# Patient Record
Sex: MALE | Race: WHITE
[De-identification: names, ages, dates, MRNs, and addresses within clinical notes are randomized per-mention and may not be internally consistent; named-entity substitution may affect disease eponyms.]

---

## 2017-04-21 ENCOUNTER — HOSPITAL ENCOUNTER (EMERGENCY)
Dept: HOSPITAL 83 - ED | Age: 57
Discharge: ANOTHER HOSPITAL | End: 2017-04-21
Payer: COMMERCIAL

## 2017-04-21 VITALS — DIASTOLIC BLOOD PRESSURE: 66 MMHG | SYSTOLIC BLOOD PRESSURE: 112 MMHG

## 2017-04-21 VITALS — HEIGHT: 60 IN | WEIGHT: 170 LBS

## 2017-04-21 DIAGNOSIS — J44.9: ICD-10-CM

## 2017-04-21 DIAGNOSIS — Z79.82: ICD-10-CM

## 2017-04-21 DIAGNOSIS — I10: ICD-10-CM

## 2017-04-21 DIAGNOSIS — E78.00: ICD-10-CM

## 2017-04-21 DIAGNOSIS — Z79.899: ICD-10-CM

## 2017-04-21 DIAGNOSIS — I73.9: ICD-10-CM

## 2017-04-21 DIAGNOSIS — E11.65: ICD-10-CM

## 2017-04-21 DIAGNOSIS — K91.841: Primary | ICD-10-CM

## 2017-04-21 LAB
BASOPHILS # BLD AUTO: 0 10*3/UL (ref 0–0.1)
BASOPHILS NFR BLD AUTO: 0.3 % (ref 0–1)
BUN SERPL-MCNC: 13 MG/DL (ref 7–24)
CHLORIDE SERPL-SCNC: 99 MMOL/L (ref 98–107)
CO2 SERPL-SCNC: 24 MMOL/L (ref 21–32)
EOSINOPHIL # BLD AUTO: 0 10*3/UL (ref 0–0.4)
EOSINOPHIL # BLD AUTO: 0.3 % (ref 1–4)
ERYTHROCYTE [DISTWIDTH] IN BLOOD BY AUTOMATED COUNT: 14.6 % (ref 0–14.5)
GLUCOSE SERPL-MCNC: 227 MG/DL (ref 65–99)
HCT VFR BLD AUTO: 22.5 % (ref 42–52)
HGB BLD-MCNC: 7.3 G/DL (ref 14–18)
IG #: 0.1 10*3/UL (ref 0–0.1)
INR BLD: 1 (ref 2–3.5)
LYMPHOCYTES # BLD AUTO: 0.8 10*3/UL (ref 1.3–4.4)
LYMPHOCYTES NFR BLD AUTO: 13.1 % (ref 27–41)
MCH RBC QN AUTO: 29.2 PG (ref 27–31)
MCHC RBC AUTO-ENTMCNC: 32.4 G/DL (ref 33–37)
MCV RBC AUTO: 90 FL (ref 80–94)
MONOCYTES # BLD AUTO: 0.9 10*3/UL (ref 0.1–1)
MONOCYTES NFR BLD MANUAL: 14.7 % (ref 3–9)
NEUT #: 4.3 10*3/UL (ref 2.3–7.9)
NEUT %: 70.8 % (ref 47–73)
NRBC BLD QL AUTO: 0 % (ref 0–0)
PLATELET # BLD AUTO: 87 10*3/UL (ref 130–400)
PMV BLD AUTO: 10.7 FL (ref 9.6–12.3)
POTASSIUM SERPL-SCNC: 4 MMOL/L (ref 3.5–5.1)
PROTHROMBIN TIME: 10.9 SECONDS (ref 9–12.4)
RBC # BLD AUTO: 2.5 10*6/UL (ref 4.5–5.9)
SODIUM SERPL-SCNC: 135 MMOL/L (ref 136–145)
WBC NRBC COR # BLD AUTO: 6.1 10*3/UL (ref 4.8–10.8)

## 2017-05-04 ENCOUNTER — HOSPITAL ENCOUNTER (INPATIENT)
Dept: HOSPITAL 83 - ED | Age: 57
LOS: 3 days | Discharge: HOME | DRG: 377 | End: 2017-05-07
Attending: INTERNAL MEDICINE | Admitting: INTERNAL MEDICINE
Payer: COMMERCIAL

## 2017-05-04 VITALS — DIASTOLIC BLOOD PRESSURE: 52 MMHG | SYSTOLIC BLOOD PRESSURE: 108 MMHG

## 2017-05-04 VITALS — DIASTOLIC BLOOD PRESSURE: 46 MMHG | SYSTOLIC BLOOD PRESSURE: 107 MMHG

## 2017-05-04 VITALS — SYSTOLIC BLOOD PRESSURE: 115 MMHG | DIASTOLIC BLOOD PRESSURE: 57 MMHG

## 2017-05-04 VITALS — HEIGHT: 65.98 IN | BODY MASS INDEX: 28.7 KG/M2 | WEIGHT: 178.57 LBS

## 2017-05-04 VITALS — SYSTOLIC BLOOD PRESSURE: 105 MMHG | DIASTOLIC BLOOD PRESSURE: 58 MMHG

## 2017-05-04 VITALS — SYSTOLIC BLOOD PRESSURE: 104 MMHG | DIASTOLIC BLOOD PRESSURE: 51 MMHG

## 2017-05-04 VITALS — DIASTOLIC BLOOD PRESSURE: 49 MMHG

## 2017-05-04 VITALS — DIASTOLIC BLOOD PRESSURE: 57 MMHG | SYSTOLIC BLOOD PRESSURE: 111 MMHG

## 2017-05-04 VITALS — SYSTOLIC BLOOD PRESSURE: 111 MMHG | DIASTOLIC BLOOD PRESSURE: 57 MMHG

## 2017-05-04 VITALS — DIASTOLIC BLOOD PRESSURE: 40 MMHG

## 2017-05-04 VITALS — DIASTOLIC BLOOD PRESSURE: 60 MMHG | SYSTOLIC BLOOD PRESSURE: 112 MMHG

## 2017-05-04 VITALS — DIASTOLIC BLOOD PRESSURE: 62 MMHG

## 2017-05-04 VITALS — SYSTOLIC BLOOD PRESSURE: 106 MMHG | DIASTOLIC BLOOD PRESSURE: 45 MMHG

## 2017-05-04 VITALS — DIASTOLIC BLOOD PRESSURE: 54 MMHG

## 2017-05-04 DIAGNOSIS — K29.71: ICD-10-CM

## 2017-05-04 DIAGNOSIS — E11.51: ICD-10-CM

## 2017-05-04 DIAGNOSIS — E11.65: ICD-10-CM

## 2017-05-04 DIAGNOSIS — C18.9: ICD-10-CM

## 2017-05-04 DIAGNOSIS — Z80.1: ICD-10-CM

## 2017-05-04 DIAGNOSIS — E43: ICD-10-CM

## 2017-05-04 DIAGNOSIS — D63.0: ICD-10-CM

## 2017-05-04 DIAGNOSIS — Z86.718: ICD-10-CM

## 2017-05-04 DIAGNOSIS — Z79.51: ICD-10-CM

## 2017-05-04 DIAGNOSIS — I10: ICD-10-CM

## 2017-05-04 DIAGNOSIS — M1A.9XX0: ICD-10-CM

## 2017-05-04 DIAGNOSIS — E87.5: ICD-10-CM

## 2017-05-04 DIAGNOSIS — F17.200: ICD-10-CM

## 2017-05-04 DIAGNOSIS — E83.41: ICD-10-CM

## 2017-05-04 DIAGNOSIS — K94.11: ICD-10-CM

## 2017-05-04 DIAGNOSIS — J44.9: ICD-10-CM

## 2017-05-04 DIAGNOSIS — I95.9: ICD-10-CM

## 2017-05-04 DIAGNOSIS — R65.10: ICD-10-CM

## 2017-05-04 DIAGNOSIS — E55.9: ICD-10-CM

## 2017-05-04 DIAGNOSIS — B37.81: ICD-10-CM

## 2017-05-04 DIAGNOSIS — Z93.0: ICD-10-CM

## 2017-05-04 DIAGNOSIS — J96.00: ICD-10-CM

## 2017-05-04 DIAGNOSIS — E78.00: ICD-10-CM

## 2017-05-04 DIAGNOSIS — Z80.8: ICD-10-CM

## 2017-05-04 DIAGNOSIS — Z79.82: ICD-10-CM

## 2017-05-04 DIAGNOSIS — D69.6: ICD-10-CM

## 2017-05-04 DIAGNOSIS — Z79.899: ICD-10-CM

## 2017-05-04 DIAGNOSIS — K92.2: Primary | ICD-10-CM

## 2017-05-04 LAB
ALBUMIN SERPL-MCNC: 2.4 GM/DL (ref 3.1–4.5)
ALP SERPL-CCNC: 363 U/L (ref 45–117)
ALT SERPL W P-5'-P-CCNC: 31 U/L (ref 12–78)
AST SERPL-CCNC: 27 IU/L (ref 3–35)
BASOPHILS # BLD AUTO: 0 10*3/UL (ref 0–0.1)
BASOPHILS NFR BLD AUTO: 0.1 % (ref 0–1)
BUN SERPL-MCNC: 24 MG/DL (ref 7–24)
CHLORIDE SERPL-SCNC: 102 MMOL/L (ref 98–107)
CO2 SERPL-SCNC: 26 MMOL/L (ref 21–32)
EOSINOPHIL # BLD AUTO: 0 10*3/UL (ref 0–0.4)
EOSINOPHIL # BLD AUTO: 0.1 % (ref 1–4)
ERYTHROCYTE [DISTWIDTH] IN BLOOD BY AUTOMATED COUNT: 15.1 % (ref 0–14.5)
GLUCOSE SERPL-MCNC: 404 MG/DL (ref 65–99)
HCT VFR BLD AUTO: 24 % (ref 42–52)
HGB BLD-MCNC: 7.6 G/DL (ref 14–18)
IG #: 0.1 10*3/UL (ref 0–0.1)
LYMPHOCYTES # BLD AUTO: 1.8 10*3/UL (ref 1.3–4.4)
LYMPHOCYTES NFR BLD AUTO: 13.3 % (ref 27–41)
MCH RBC QN AUTO: 28.6 PG (ref 27–31)
MCHC RBC AUTO-ENTMCNC: 31.7 G/DL (ref 33–37)
MCV RBC AUTO: 90.2 FL (ref 80–94)
MONOCYTES # BLD AUTO: 0.3 10*3/UL (ref 0.1–1)
MONOCYTES NFR BLD MANUAL: 2.4 % (ref 3–9)
NEUT #: 11.4 10*3/UL (ref 2.3–7.9)
NEUT %: 83.7 % (ref 47–73)
NRBC BLD QL AUTO: 0 10*3/UL (ref 0–0)
PLATELET # BLD AUTO: 207 10*3/UL (ref 130–400)
PMV BLD AUTO: 11.3 FL (ref 9.6–12.3)
POTASSIUM SERPL-SCNC: 6.3 MMOL/L (ref 3.5–5.1)
PROT SERPL-MCNC: 7.3 GM/DL (ref 6.4–8.2)
RBC # BLD AUTO: 2.66 10*6/UL (ref 4.5–5.9)
SODIUM SERPL-SCNC: 132 MMOL/L (ref 136–145)
TROPONIN I SERPL-MCNC: 0.08 NG/ML (ref ?–0.04)
WBC NRBC COR # BLD AUTO: 13.6 10*3/UL (ref 4.8–10.8)

## 2017-05-04 PROCEDURE — 30233N1 TRANSFUSION OF NONAUTOLOGOUS RED BLOOD CELLS INTO PERIPHERAL VEIN, PERCUTANEOUS APPROACH: ICD-10-PCS | Performed by: INTERNAL MEDICINE

## 2017-05-05 VITALS — DIASTOLIC BLOOD PRESSURE: 31 MMHG | SYSTOLIC BLOOD PRESSURE: 94 MMHG

## 2017-05-05 VITALS — SYSTOLIC BLOOD PRESSURE: 118 MMHG | DIASTOLIC BLOOD PRESSURE: 53 MMHG

## 2017-05-05 VITALS — SYSTOLIC BLOOD PRESSURE: 125 MMHG | DIASTOLIC BLOOD PRESSURE: 67 MMHG

## 2017-05-05 VITALS — DIASTOLIC BLOOD PRESSURE: 52 MMHG

## 2017-05-05 VITALS — SYSTOLIC BLOOD PRESSURE: 112 MMHG | DIASTOLIC BLOOD PRESSURE: 63 MMHG

## 2017-05-05 VITALS — DIASTOLIC BLOOD PRESSURE: 61 MMHG

## 2017-05-05 VITALS — SYSTOLIC BLOOD PRESSURE: 92 MMHG | DIASTOLIC BLOOD PRESSURE: 43 MMHG

## 2017-05-05 VITALS — DIASTOLIC BLOOD PRESSURE: 59 MMHG

## 2017-05-05 LAB
25(OH)D3 SERPL-MCNC: 26.7 NG/ML (ref 30–100)
ALBUMIN SERPL-MCNC: 2 GM/DL (ref 3.1–4.5)
ALP SERPL-CCNC: 282 U/L (ref 45–117)
ALT SERPL W P-5'-P-CCNC: 22 U/L (ref 12–78)
AST SERPL-CCNC: 17 IU/L (ref 3–35)
BASOPHILS # BLD AUTO: 0 10*3/UL (ref 0–0.1)
BASOPHILS # BLD AUTO: 0 10*3/UL (ref 0–0.1)
BASOPHILS NFR BLD AUTO: 0.1 % (ref 0–1)
BASOPHILS NFR BLD AUTO: 0.1 % (ref 0–1)
BUN SERPL-MCNC: 20 MG/DL (ref 7–24)
BUN SERPL-MCNC: 22 MG/DL (ref 7–24)
CHLORIDE SERPL-SCNC: 107 MMOL/L (ref 98–107)
CHLORIDE SERPL-SCNC: 107 MMOL/L (ref 98–107)
CHOLEST SERPL-MCNC: 120 MG/DL (ref ?–200)
CK MB SERPL-MCNC: 0.6 NG/ML (ref 0.5–3.6)
CK MB SERPL-MCNC: < 0.5 NG/ML (ref 0.5–3.6)
CK MB SERPL-MCNC: < 0.5 NG/ML (ref 0.5–3.6)
CK SERPL-CCNC: 61 U/L (ref 39–308)
CK SERPL-CCNC: 64 U/L (ref 39–308)
CK SERPL-CCNC: 65 U/L (ref 39–308)
CO2 SERPL-SCNC: 26 MMOL/L (ref 21–32)
CO2 SERPL-SCNC: 26 MMOL/L (ref 21–32)
EOSINOPHIL # BLD AUTO: 0 10*3/UL (ref 0–0.4)
EOSINOPHIL # BLD AUTO: 0 10*3/UL (ref 0–0.4)
EOSINOPHIL # BLD AUTO: 0.2 % (ref 1–4)
EOSINOPHIL # BLD AUTO: 0.2 % (ref 1–4)
ERYTHROCYTE [DISTWIDTH] IN BLOOD BY AUTOMATED COUNT: 15.2 % (ref 0–14.5)
ERYTHROCYTE [DISTWIDTH] IN BLOOD BY AUTOMATED COUNT: 15.3 % (ref 0–14.5)
EST. AVERAGE GLUCOSE BLD GHB EST-MCNC: 166 MG/DL
FOLATE SERPL-MCNC: > 24 NG/ML (ref 5.38–?)
GLUCOSE SERPL-MCNC: 157 MG/DL (ref 65–99)
GLUCOSE SERPL-MCNC: 168 MG/DL (ref 65–99)
HCT VFR BLD AUTO: 26.2 % (ref 42–52)
HCT VFR BLD AUTO: 26.6 % (ref 42–52)
HDLC SERPL-MCNC: 41 MG/DL (ref 40–60)
HGB BLD-MCNC: 8.6 G/DL (ref 14–18)
HGB BLD-MCNC: 8.8 G/DL (ref 14–18)
IG #: 0 10*3/UL (ref 0–0.1)
IG #: 0.1 10*3/UL (ref 0–0.1)
INR BLD: 1 (ref 2–3.5)
LDLC SERPL DIRECT ASSAY-MCNC: 54 MG/DL (ref 9–159)
LYMPHOCYTES # BLD AUTO: 2 10*3/UL (ref 1.3–4.4)
LYMPHOCYTES # BLD AUTO: 2.1 10*3/UL (ref 1.3–4.4)
LYMPHOCYTES NFR BLD AUTO: 19.4 % (ref 27–41)
LYMPHOCYTES NFR BLD AUTO: 20.1 % (ref 27–41)
MAGNESIUM SERPL-MCNC: 1.3 MG/DL (ref 1.5–2.1)
MCH RBC QN AUTO: 28.7 PG (ref 27–31)
MCH RBC QN AUTO: 29 PG (ref 27–31)
MCHC RBC AUTO-ENTMCNC: 32.8 G/DL (ref 33–37)
MCHC RBC AUTO-ENTMCNC: 33.1 G/DL (ref 33–37)
MCV RBC AUTO: 87.3 FL (ref 80–94)
MCV RBC AUTO: 87.8 FL (ref 80–94)
MONOCYTES # BLD AUTO: 0.3 10*3/UL (ref 0.1–1)
MONOCYTES # BLD AUTO: 0.3 10*3/UL (ref 0.1–1)
MONOCYTES NFR BLD MANUAL: 2.5 % (ref 3–9)
MONOCYTES NFR BLD MANUAL: 2.9 % (ref 3–9)
NEUT #: 7.9 10*3/UL (ref 2.3–7.9)
NEUT #: 8 10*3/UL (ref 2.3–7.9)
NEUT %: 76.1 % (ref 47–73)
NEUT %: 77.5 % (ref 47–73)
NRBC BLD QL AUTO: 0 % (ref 0–0)
NRBC BLD QL AUTO: 0 10*3/UL (ref 0–0)
PHOSPHATE SERPL-MCNC: 2.6 MG/DL (ref 2.5–4.9)
PLATELET # BLD AUTO: 132 10*3/UL (ref 130–400)
PLATELET # BLD AUTO: 146 10*3/UL (ref 130–400)
PMV BLD AUTO: 10.8 FL (ref 9.6–12.3)
PMV BLD AUTO: 11.4 FL (ref 9.6–12.3)
POTASSIUM SERPL-SCNC: 4.9 MMOL/L (ref 3.5–5.1)
POTASSIUM SERPL-SCNC: 5.3 MMOL/L (ref 3.5–5.1)
PROT SERPL-MCNC: 6 GM/DL (ref 6.4–8.2)
PROTHROMBIN TIME: 10.6 SECONDS (ref 9–12.4)
RBC # BLD AUTO: 3 10*6/UL (ref 4.5–5.9)
RBC # BLD AUTO: 3.03 10*6/UL (ref 4.5–5.9)
SODIUM SERPL-SCNC: 138 MMOL/L (ref 136–145)
SODIUM SERPL-SCNC: 141 MMOL/L (ref 136–145)
T4 FREE SERPL-MCNC: 0.99 NG/DL (ref 0.76–1.46)
TRIGL SERPL-MCNC: 127 MG/DL (ref ?–150)
TROPONIN I SERPL-MCNC: 0.04 NG/ML (ref ?–0.04)
TROPONIN I SERPL-MCNC: 0.05 NG/ML (ref ?–0.04)
TROPONIN I SERPL-MCNC: 0.06 NG/ML (ref ?–0.04)
TSH SERPL DL<=0.005 MIU/L-ACNC: 1.22 UIU/ML (ref 0.36–4.75)
VITAMIN B12: 647 PG/ML (ref 247–911)
VLDLC SERPL CALC-MCNC: 25 MG/DL (ref 6–40)
WBC NRBC COR # BLD AUTO: 10.4 10*3/UL (ref 4.8–10.8)
WBC NRBC COR # BLD AUTO: 10.4 10*3/UL (ref 4.8–10.8)

## 2017-05-05 PROCEDURE — 0DJD8ZZ INSPECTION OF LOWER INTESTINAL TRACT, VIA NATURAL OR ARTIFICIAL OPENING ENDOSCOPIC: ICD-10-PCS | Performed by: INTERNAL MEDICINE

## 2017-05-05 PROCEDURE — 0DB78ZX EXCISION OF STOMACH, PYLORUS, VIA NATURAL OR ARTIFICIAL OPENING ENDOSCOPIC, DIAGNOSTIC: ICD-10-PCS | Performed by: INTERNAL MEDICINE

## 2017-05-06 VITALS — SYSTOLIC BLOOD PRESSURE: 134 MMHG | DIASTOLIC BLOOD PRESSURE: 88 MMHG

## 2017-05-06 VITALS — DIASTOLIC BLOOD PRESSURE: 62 MMHG

## 2017-05-06 VITALS — DIASTOLIC BLOOD PRESSURE: 49 MMHG | SYSTOLIC BLOOD PRESSURE: 120 MMHG

## 2017-05-06 VITALS — DIASTOLIC BLOOD PRESSURE: 59 MMHG

## 2017-05-06 VITALS — DIASTOLIC BLOOD PRESSURE: 61 MMHG

## 2017-05-06 VITALS — DIASTOLIC BLOOD PRESSURE: 58 MMHG

## 2017-05-06 VITALS — DIASTOLIC BLOOD PRESSURE: 66 MMHG

## 2017-05-06 VITALS — DIASTOLIC BLOOD PRESSURE: 71 MMHG | SYSTOLIC BLOOD PRESSURE: 138 MMHG

## 2017-05-06 VITALS — SYSTOLIC BLOOD PRESSURE: 115 MMHG | DIASTOLIC BLOOD PRESSURE: 75 MMHG

## 2017-05-06 VITALS — SYSTOLIC BLOOD PRESSURE: 118 MMHG | DIASTOLIC BLOOD PRESSURE: 63 MMHG

## 2017-05-06 VITALS — DIASTOLIC BLOOD PRESSURE: 68 MMHG

## 2017-05-06 VITALS — DIASTOLIC BLOOD PRESSURE: 54 MMHG

## 2017-05-06 LAB
BASOPHILS # BLD AUTO: 0 10*3/UL (ref 0–0.1)
BASOPHILS NFR BLD AUTO: 0 % (ref 0–1)
BUN SERPL-MCNC: 17 MG/DL (ref 7–24)
CHLORIDE SERPL-SCNC: 106 MMOL/L (ref 98–107)
CO2 SERPL-SCNC: 27 MMOL/L (ref 21–32)
EOSINOPHIL # BLD AUTO: 0.1 10*3/UL (ref 0–0.4)
EOSINOPHIL # BLD AUTO: 0.9 % (ref 1–4)
ERYTHROCYTE [DISTWIDTH] IN BLOOD BY AUTOMATED COUNT: 15.2 % (ref 0–14.5)
GLUCOSE SERPL-MCNC: 328 MG/DL (ref 65–99)
HCT VFR BLD AUTO: 24.5 % (ref 42–52)
HGB BLD-MCNC: 7.7 G/DL (ref 14–18)
IG #: 0.1 10*3/UL (ref 0–0.1)
LYMPHOCYTES # BLD AUTO: 1.5 10*3/UL (ref 1.3–4.4)
LYMPHOCYTES NFR BLD AUTO: 17.4 % (ref 27–41)
MCH RBC QN AUTO: 28.4 PG (ref 27–31)
MCHC RBC AUTO-ENTMCNC: 31.4 G/DL (ref 33–37)
MCV RBC AUTO: 90.4 FL (ref 80–94)
MONOCYTES # BLD AUTO: 0.4 10*3/UL (ref 0.1–1)
MONOCYTES NFR BLD MANUAL: 4.5 % (ref 3–9)
NEUT #: 6.7 10*3/UL (ref 2.3–7.9)
NEUT %: 76.6 % (ref 47–73)
NRBC BLD QL AUTO: 0 % (ref 0–0)
PLATELET # BLD AUTO: 107 10*3/UL (ref 130–400)
PMV BLD AUTO: 11.7 FL (ref 9.6–12.3)
POTASSIUM SERPL-SCNC: 4.3 MMOL/L (ref 3.5–5.1)
RBC # BLD AUTO: 2.71 10*6/UL (ref 4.5–5.9)
SODIUM SERPL-SCNC: 142 MMOL/L (ref 136–145)
WBC NRBC COR # BLD AUTO: 8.7 10*3/UL (ref 4.8–10.8)

## 2017-05-07 VITALS — DIASTOLIC BLOOD PRESSURE: 57 MMHG | SYSTOLIC BLOOD PRESSURE: 112 MMHG

## 2017-05-07 VITALS — SYSTOLIC BLOOD PRESSURE: 123 MMHG | DIASTOLIC BLOOD PRESSURE: 68 MMHG

## 2017-05-07 VITALS — DIASTOLIC BLOOD PRESSURE: 50 MMHG | SYSTOLIC BLOOD PRESSURE: 105 MMHG

## 2017-05-07 LAB
BUN SERPL-MCNC: 7 MG/DL (ref 7–24)
CHLORIDE SERPL-SCNC: 109 MMOL/L (ref 98–107)
CO2 SERPL-SCNC: 24 MMOL/L (ref 21–32)
GLUCOSE SERPL-MCNC: 197 MG/DL (ref 65–99)
HCT VFR BLD AUTO: 26.4 % (ref 42–52)
HGB BLD-MCNC: 8.7 G/DL (ref 14–18)
POTASSIUM SERPL-SCNC: 4.2 MMOL/L (ref 3.5–5.1)
SODIUM SERPL-SCNC: 141 MMOL/L (ref 136–145)

## 2017-05-19 ENCOUNTER — HOSPITAL ENCOUNTER (EMERGENCY)
Dept: HOSPITAL 83 - ED | Age: 57
Discharge: LEFT BEFORE DISCHARGE | End: 2017-05-19
Payer: COMMERCIAL

## 2017-05-19 VITALS — HEIGHT: 60 IN | WEIGHT: 160 LBS

## 2017-05-19 VITALS — DIASTOLIC BLOOD PRESSURE: 61 MMHG | SYSTOLIC BLOOD PRESSURE: 114 MMHG

## 2017-05-19 DIAGNOSIS — Z79.01: ICD-10-CM

## 2017-05-19 DIAGNOSIS — J44.9: ICD-10-CM

## 2017-05-19 DIAGNOSIS — Z79.899: ICD-10-CM

## 2017-05-19 DIAGNOSIS — E78.00: ICD-10-CM

## 2017-05-19 DIAGNOSIS — Z93.3: ICD-10-CM

## 2017-05-19 DIAGNOSIS — Z79.82: ICD-10-CM

## 2017-05-19 DIAGNOSIS — I10: ICD-10-CM

## 2017-05-19 DIAGNOSIS — F17.200: ICD-10-CM

## 2017-05-19 DIAGNOSIS — K92.2: Primary | ICD-10-CM

## 2017-05-19 LAB
ALBUMIN SERPL-MCNC: 2.1 GM/DL (ref 3.1–4.5)
ALP SERPL-CCNC: 407 U/L (ref 45–117)
ALT SERPL W P-5'-P-CCNC: 26 U/L (ref 12–78)
AST SERPL-CCNC: 23 IU/L (ref 3–35)
BASOPHILS # BLD AUTO: 0 10*3/UL (ref 0–0.1)
BASOPHILS NFR BLD AUTO: 0.2 % (ref 0–1)
BUN SERPL-MCNC: 19 MG/DL (ref 7–24)
CHLORIDE SERPL-SCNC: 99 MMOL/L (ref 98–107)
CK MB SERPL-MCNC: < 0.5 NG/ML (ref 0.5–3.6)
CK SERPL-CCNC: 165 U/L (ref 39–308)
CO2 SERPL-SCNC: 27 MMOL/L (ref 21–32)
CRP SERPL-MCNC: 18.4 MG/DL (ref 0–0.3)
EOSINOPHIL # BLD AUTO: 0.1 10*3/UL (ref 0–0.4)
EOSINOPHIL # BLD AUTO: 1.7 % (ref 1–4)
ERYTHROCYTE [DISTWIDTH] IN BLOOD BY AUTOMATED COUNT: 17.5 % (ref 0–14.5)
GLUCOSE SERPL-MCNC: 349 MG/DL (ref 65–99)
HCT VFR BLD AUTO: 27 % (ref 42–52)
HGB BLD-MCNC: 8.7 G/DL (ref 14–18)
IG #: 0 10*3/UL (ref 0–0.1)
INR BLD: 1 (ref 2–3.5)
LYMPHOCYTES # BLD AUTO: 1.2 10*3/UL (ref 1.3–4.4)
LYMPHOCYTES NFR BLD AUTO: 24.8 % (ref 27–41)
MAGNESIUM SERPL-MCNC: 1.8 MG/DL (ref 1.5–2.1)
MCH RBC QN AUTO: 27.8 PG (ref 27–31)
MCHC RBC AUTO-ENTMCNC: 32.2 G/DL (ref 33–37)
MCV RBC AUTO: 86.3 FL (ref 80–94)
MONOCYTES # BLD AUTO: 0.5 10*3/UL (ref 0.1–1)
MONOCYTES NFR BLD MANUAL: 10.4 % (ref 3–9)
MYOGLOBIN SERPL-MCNC: 23 NG/ML (ref 16–116)
NEUT #: 2.9 10*3/UL (ref 2.3–7.9)
NEUT %: 62.3 % (ref 47–73)
NRBC BLD QL AUTO: 0 % (ref 0–0)
PLATELET # BLD AUTO: 118 10*3/UL (ref 130–400)
PMV BLD AUTO: 11.9 FL (ref 9.6–12.3)
POTASSIUM SERPL-SCNC: 4.4 MMOL/L (ref 3.5–5.1)
PROT SERPL-MCNC: 6.6 GM/DL (ref 6.4–8.2)
PROTHROMBIN TIME: 10.1 SECONDS (ref 9–12.4)
RBC # BLD AUTO: 3.13 10*6/UL (ref 4.5–5.9)
SODIUM SERPL-SCNC: 134 MMOL/L (ref 136–145)
TROPONIN I SERPL-MCNC: < 0.015 NG/ML (ref ?–0.04)
WBC NRBC COR # BLD AUTO: 4.7 10*3/UL (ref 4.8–10.8)

## 2017-10-25 ENCOUNTER — HOSPITAL ENCOUNTER (INPATIENT)
Dept: HOSPITAL 83 - ED | Age: 57
LOS: 2 days | Discharge: HOME | DRG: 190 | End: 2017-10-27
Attending: INTERNAL MEDICINE | Admitting: INTERNAL MEDICINE
Payer: COMMERCIAL

## 2017-10-25 VITALS — DIASTOLIC BLOOD PRESSURE: 57 MMHG

## 2017-10-25 VITALS — DIASTOLIC BLOOD PRESSURE: 55 MMHG | SYSTOLIC BLOOD PRESSURE: 96 MMHG

## 2017-10-25 VITALS — DIASTOLIC BLOOD PRESSURE: 56 MMHG | SYSTOLIC BLOOD PRESSURE: 89 MMHG

## 2017-10-25 VITALS — SYSTOLIC BLOOD PRESSURE: 113 MMHG | DIASTOLIC BLOOD PRESSURE: 36 MMHG

## 2017-10-25 VITALS — BODY MASS INDEX: 21.7 KG/M2 | HEIGHT: 66 IN | WEIGHT: 135.03 LBS

## 2017-10-25 VITALS — DIASTOLIC BLOOD PRESSURE: 60 MMHG

## 2017-10-25 VITALS — DIASTOLIC BLOOD PRESSURE: 50 MMHG

## 2017-10-25 VITALS — DIASTOLIC BLOOD PRESSURE: 56 MMHG | SYSTOLIC BLOOD PRESSURE: 98 MMHG

## 2017-10-25 DIAGNOSIS — R55: ICD-10-CM

## 2017-10-25 DIAGNOSIS — Z79.82: ICD-10-CM

## 2017-10-25 DIAGNOSIS — Z92.21: ICD-10-CM

## 2017-10-25 DIAGNOSIS — E86.0: ICD-10-CM

## 2017-10-25 DIAGNOSIS — Z98.62: ICD-10-CM

## 2017-10-25 DIAGNOSIS — Z86.718: ICD-10-CM

## 2017-10-25 DIAGNOSIS — Z82.3: ICD-10-CM

## 2017-10-25 DIAGNOSIS — E43: ICD-10-CM

## 2017-10-25 DIAGNOSIS — Z93.3: ICD-10-CM

## 2017-10-25 DIAGNOSIS — Z91.81: ICD-10-CM

## 2017-10-25 DIAGNOSIS — C18.7: ICD-10-CM

## 2017-10-25 DIAGNOSIS — E11.51: ICD-10-CM

## 2017-10-25 DIAGNOSIS — E87.1: ICD-10-CM

## 2017-10-25 DIAGNOSIS — F17.210: ICD-10-CM

## 2017-10-25 DIAGNOSIS — Z82.49: ICD-10-CM

## 2017-10-25 DIAGNOSIS — Z79.84: ICD-10-CM

## 2017-10-25 DIAGNOSIS — Z79.899: ICD-10-CM

## 2017-10-25 DIAGNOSIS — C78.7: ICD-10-CM

## 2017-10-25 DIAGNOSIS — D64.81: ICD-10-CM

## 2017-10-25 DIAGNOSIS — E78.00: ICD-10-CM

## 2017-10-25 DIAGNOSIS — E11.65: ICD-10-CM

## 2017-10-25 DIAGNOSIS — J44.0: Primary | ICD-10-CM

## 2017-10-25 DIAGNOSIS — N40.0: ICD-10-CM

## 2017-10-25 DIAGNOSIS — E55.9: ICD-10-CM

## 2017-10-25 DIAGNOSIS — Z83.3: ICD-10-CM

## 2017-10-25 DIAGNOSIS — I10: ICD-10-CM

## 2017-10-25 DIAGNOSIS — J15.6: ICD-10-CM

## 2017-10-25 DIAGNOSIS — Z89.422: ICD-10-CM

## 2017-10-25 DIAGNOSIS — Z81.1: ICD-10-CM

## 2017-10-25 DIAGNOSIS — Z87.01: ICD-10-CM

## 2017-10-25 DIAGNOSIS — Z89.421: ICD-10-CM

## 2017-10-25 DIAGNOSIS — I95.9: ICD-10-CM

## 2017-10-25 DIAGNOSIS — M10.9: ICD-10-CM

## 2017-10-25 DIAGNOSIS — Z86.711: ICD-10-CM

## 2017-10-25 DIAGNOSIS — Z90.49: ICD-10-CM

## 2017-10-25 DIAGNOSIS — Z80.8: ICD-10-CM

## 2017-10-25 DIAGNOSIS — Z80.1: ICD-10-CM

## 2017-10-25 LAB
ALBUMIN SERPL-MCNC: 2.2 GM/DL (ref 3.1–4.5)
ALP SERPL-CCNC: 480 U/L (ref 45–117)
ALT SERPL W P-5'-P-CCNC: 36 U/L (ref 12–78)
APPEARANCE UR: CLEAR
APTT PPP: 30.9 SECONDS (ref 20.8–31.5)
AST SERPL-CCNC: 59 IU/L (ref 3–35)
BACTERIA #/AREA URNS HPF: (no result) /[HPF]
BASOPHILS # BLD AUTO: 0 10*3/UL (ref 0–0.1)
BASOPHILS NFR BLD AUTO: 0.5 % (ref 0–1)
BILIRUB UR QL STRIP: NEGATIVE
BUN SERPL-MCNC: 30 MG/DL (ref 7–24)
CHLORIDE SERPL-SCNC: 92 MMOL/L (ref 98–107)
COLOR UR: YELLOW
CREAT SERPL-MCNC: 1.09 MG/DL (ref 0.7–1.3)
EOSINOPHIL # BLD AUTO: 0 % (ref 1–4)
EOSINOPHIL # BLD AUTO: 0 10*3/UL (ref 0–0.4)
EPI CELLS #/AREA URNS HPF: (no result) /[HPF]
ERYTHROCYTE [DISTWIDTH] IN BLOOD BY AUTOMATED COUNT: 15.9 % (ref 0–14.5)
GLUCOSE UR QL: NEGATIVE
HCT VFR BLD AUTO: 31.6 % (ref 42–52)
HGB BLD-MCNC: 10.2 G/DL (ref 14–18)
HGB UR QL STRIP: NEGATIVE
INR BLD: 1.2 (ref 2–3.5)
KETONES UR QL STRIP: NEGATIVE
LEUKOCYTE ESTERASE UR QL STRIP: NEGATIVE
LIPASE SERPL-CCNC: 114 U/L (ref 73–393)
LYMPHOCYTES # BLD AUTO: 1.5 10*3/UL (ref 1.3–4.4)
LYMPHOCYTES NFR BLD AUTO: 23.3 % (ref 27–41)
MCH RBC QN AUTO: 28.4 PG (ref 27–31)
MCHC RBC AUTO-ENTMCNC: 32.3 G/DL (ref 33–37)
MCV RBC AUTO: 88 FL (ref 80–94)
MONOCYTES # BLD AUTO: 0.3 10*3/UL (ref 0.1–1)
MONOCYTES NFR BLD MANUAL: 4.2 % (ref 3–9)
NEUT #: 4.5 10*3/UL (ref 2.3–7.9)
NEUT %: 71.5 % (ref 47–73)
NITRITE UR QL STRIP: NEGATIVE
NRBC BLD QL AUTO: 0 10*3/UL (ref 0–0)
PH UR STRIP: 5.5 [PH] (ref 5–9)
PLATELET # BLD AUTO: 236 10*3/UL (ref 130–400)
PMV BLD AUTO: 11.8 FL (ref 9.6–12.3)
POTASSIUM SERPL-SCNC: 5.2 MMOL/L (ref 3.5–5.1)
PROT SERPL-MCNC: 8.3 GM/DL (ref 6.4–8.2)
RBC # BLD AUTO: 3.59 10*6/UL (ref 4.5–5.9)
RBC #/AREA URNS HPF: (no result) RBC/HPF (ref 0–2)
SODIUM SERPL-SCNC: 130 MMOL/L (ref 136–145)
SP GR UR: 1.02 (ref 1–1.03)
TROPONIN I SERPL-MCNC: < 0.015 NG/ML (ref ?–0.04)
UROBILINOGEN UR STRIP-MCNC: 0.2 E.U./DL (ref 0.2–1)
WBC #/AREA URNS HPF: (no result) WBC/HPF (ref 0–5)
WBC NRBC COR # BLD AUTO: 6.2 10*3/UL (ref 4.8–10.8)

## 2017-10-25 NOTE — NUR
PT. AWAKE, ALERT AND ORIENTED X 3 AT THIS TIME. PT. CURRENTLY DENIES CP, SOB,
N/V/D OR PAIN. PT. COLOSTOMY C/D/I, STOMA APPROPRIATE COLOR. PT. PRESSURE
AREAS ASYMPTOMATIC. CALL LIGHT WITHIN REACH, BED IN LOWEST POSITION, WHEELS
LOCKED. SEE SHIFT ASSESSMENT.

## 2017-10-25 NOTE — NUR
REPORT RECEIVED FROM TALON SMYTH. PT IS AWAKE AND ALERT. HIS COLOR IS
SALLOW.SKIN W/D. RESPIRATIONS APPEAR NON-LABORED. IV FLUID IS INFUSING VIA
MEDIPORT RIGHT CHEST. SITE APPEARS ASYMPTOMATIC. NO CO DISCOMFORT AT THIS TIME.
NALDO RN

## 2017-10-25 NOTE — NUR
DRY CLEAN DRESSING APPLIED TO LLQ WOUND-OLD COLOSTOMY SITE. NEW OSTOMY
APPLIANCE APPLIED TO ILEOSTOMY TO RLQ. CALL LIGHT WITHIN REACH.

## 2017-10-25 NOTE — NUR
MEDICATED WITH PRN MORPHINE AS ORDERED FOR C/O PAIN TO LOWER BACK, HIPS AND
LEGS. RATES PAIN 8/10. WILL MONITOR FOR EFFECTIVENESS.

## 2017-10-25 NOTE — NUR
A 57, admitted to , under the
services of ARIELLE Stacy DO with a diagnosis of
SYNCOPE,PNEUMONIA,DEHYDRATION.
Chief complaint is LOW BP AT HOME PER VN'S.
Patient arrived via bed from ER.
Monitor applied. Initial assessment completed.
Vital signs taken and recorded.
ARIELLE STACY DO notified of admission to the unit.
Orders received.
See assessment for past medical history, medications
and allergies.
Patient and/or family oriented to unit. [facility]
visitation policy reviewed.
Clothing/patient valuable form completed.
 
IMMANUEL MOORE R

## 2017-10-25 NOTE — NUR
EVA MCGEE UPDATED ON WOUNDS, STATES TO APPLY DRY CLEAN DRESSING TO LLQ OLD
COLOSTOMY SITE AND UPDATED THAT MED REC IS CURRENT PER LIST PROVIDED BY WIFE.

## 2017-10-25 NOTE — NUR
MEDICATED WITH MORPHINE FOR 7/10 PAIN FROM HIPS DOWN. PT STATES PAIN IS
ACHING. WILL MONITOR FOR EFFECTIVENESS.

## 2017-10-25 NOTE — CON
[place], [place]
 
                                 REPORT OF CONSULTATION
 
        NAME: LEANNE ARNOLD                    [id number]  
        UNIT #: [id number]                         ROOM: 506       
        DOCTOR: GERA THAO MD              BIRTHDATE: [birth year]
 
 
DOS: 10/26/2017
 
HISTORY OF PRESENT ILLNESS:  The patient is a pleasant 57-year-old gentleman
with a history of metastatic colon cancer diagnosed about 18 months ago, has
been pretty well as far as his stage IV cancer is concerned.  He finished his
chemotherapy on Wednesday.  He presented to the Emergency Department since he
had not been feeling good and has had frequent falls associated with some nausea
and vomiting.  He was brought in by private vehicle by his wife.  He was found
to be hypotensive as well as had been feeling dizzy and falling over multiple
times and consulted for further evaluation of metastatic disease.  He also was
noted to have pneumonia.
 
PAST MEDICAL HISTORY:  Significant for metastatic stage IV colon cancer,
diagnosed about 18 months ago; history of acute intestinal obstruction,
underwent surgery; acute respiratory failure; COPD; esophageal moniliasis;
essential hypertension; history of DV thrombosis; pulmonary embolism;
hypercholesterolemia; hypercalcemia; hyperkalemia; hypomagnesemia; peripheral
vascular disease.
 
SOCIAL HISTORY:  Smokes half packet per day for 40 years.  Denies any drinking.
 
PAST SURGICAL HISTORY:  Colostomy, history of toe surgery with prominent toe
amputation, history of tracheostomy, peripheral vascular angioplasty as well as
history of inferior vena filter placement.
 
FAMILY HISTORY:  Father  at 76 with throat cancer.  Mother  at
77 of lung cancer.
 
ALLERGIES:  No allergies.
 
MEDICATIONS:  Albuterol, allopurinol, aspirin, baclofen, carvedilol, Celebrex,
Plavix, ____, Advair, Lasix, Neurontin, Lopid, Amaryl, levothyroxine, Cozaar,
Mevacor, metformin, metoclopramide, Mucinex, Centrum Silver, Procardia, Zofran,
Protonix, Zantac, Requip, Januvia, Aubagio, Spiriva, Ultram, Effexor.
 
RADIOLOGY:  CT of the head without contrast showed no evidence of acute
territorial infarction, acute intracranial hemorrhage.  There is a focal
hypodensity in the left posterior occipital parietal region extending into the
cortex, possibly representing a focal cortical infarct, but given the history of
malignancy, metastatic focus cannot be excluded, correlate clinically with
enhanced MRI.  CT of the cervical spine showed no acute cervical spine fracture,
minimal degenerative disease.  CT scan of the abdomen and pelvis showed no acute
findings except for the liver mets.
 
REVIEW OF SYSTEMS
CONSTITUTIONAL:  No chills.  No fatigue.  No fever.  No loss of appetite.  No
night sweats.  No weakness.  No weight loss.
HEENT:  No trouble swallowing.  No loss of smell.  No loss of hearing.  No
double vision.  No pain.  No discharge.
ENT AND RESPIRATORY:  No wheeze.  No sore throat.  No change in voice.  No
 
                              [place], [place]
 
                                 REPORT OF CONSULTATION
 
        NAME: LEANNE ARNOLD                    ACCT #: [id number]  
        UNIT #: [id number]                         ROOM: 506       
        DOCTOR: GERA THAO MD              BIRTHDATE: [birth year]
 
 
hearing loss.  No nose bleed.  No cough.  No trouble breathing through nose.  No
shortness of breath.  No coughing up blood.  No epistaxis.
CARDIOVASCULAR:  No chest pain.  No dizziness.  No irregular heartbeat.  No leg
edema.  No pain in legs while walking.  No palpitations.  No shortness of
breath.
DERMATOLOGIC:  No acne.  No hives.  No laceration.  No mole.  No rash.
ENDOCRINE:  No cold intolerance.  No diabetes.  No fatigue.  No hot flashes.  No
polydipsia.  No polyuria.  No urinating frequently.  No weight loss.
HEMATOLOGIC AND LYMPH:   No fatigue.  No easy bruising.
GASTROENTEROLOGIC:  No change in bowel habits.  No indigestion.  No frequent
bloating.  No vomiting blood.  No abdominal cramping.  No nausea.  No heartburn.
 No vomiting. No abdominal pain.  No dysphagia.  No diarrhea.  No constipation. 
No blood in stool.
MALE REPRODUCTIVE:  No testicular pain.  No difficulty with erection. No
diminished sexual drive.  No penile discharge.
MUSCULOSKELETAL:  No back pain. No muscle pain or weakness.  No neck pain. No
tingling/numbness.  No swelling/bruising.  No osteoporosis treatment.
OPHTHALMOLOGIC:  No double vision.  No diminished vision.  No loss of vision.
UROLOGIC:  No dysuria.  No frequent nighttime urination.  No pain with
urination.  No difficulty urinating.  No blood in urine.  No frequent urination.
No urinary incontinence.
NEUROLOGIC:  No loss of sensation in specific body area.  No vertigo.  No
burning pain in feet.  No trouble with balance.  No trouble with coordination. 
No loss of consciousness.  No loss of feeling/power.  No confusion.  No
headache.  No tingling/numbness.
PSYCHOLOGIC:  No tinnitus.  No headaches.  No shortness of breath.  No weight
decrease.  No nausea.  No vomiting.  No abdominal discomfort.  No constipation. 
No diarrhea.  No depression. No anxiety.
 
PHYSICAL EXAMINATION:
GENERAL:  General appearance:  Pleasant patient, no apparent distress.
VITAL SIGNS:  Stable, afebrile.
HEENT:  Oral mucosa appears intact.  The external ears are normal in appearance.
 Nares are patent without lesions, exudates, erythema, or inflammation.  Tongue
is symmetrical.  Uvula is midline.  
NECK AND THYROID:  Neck supple without palpable masses.  Trachea is midline.  No
thyromegaly.  No carotid bruit or JVD.  
BREASTS:  Normal.  Nipples unremarkable.  No drainage.  No lumps felt on either
side.
HEART:  Normal S1, S2, without significant murmur, rub, or gallop.  
LUNGS:  Clear to auscultation and percussion with good air entry bilaterally. 
The patient is breathing easily without the use of accessory muscles. 
Diaphragmatic excursions are intact.
ABDOMEN:  Colostomy in place, wound healed.
LYMPHATIC:  No adenopathy noted in the cervical, supraclavicular, axillary, or
inguinal regions.
NEUROLOGIC:  Nonfocal.  Oriented to person, place, and time.  
MENTAL STATUS:  Appropriate for mood and affect.  
PERIPHERAL PULSES:  No varicosities.  Femoral and pedal pulses are palpable.
EXTREMITIES:  Without cyanosis, clubbing, or edema.  No gross anomalies.
 
                              [place], [place]
 
                                 REPORT OF CONSULTATION
 
        NAME: LEANNE ARNOLD                    ACCT #: [id number]  
        UNIT #: [id number]                         ROOM: [facility]       
        DOCTOR: GERA THAO MD              BIRTHDATE: [birth year]
 
 
 
LABORATORY DATA:  Sodium 130, potassium 5.2, chloride 92, bicarbonate 27, BUN of
30, EGFR is more than 60.  White count of 6.2, hemoglobin 10.2, hematocrit 31.6,
platelet count of 236.  Chest x-ray showed minimal patchy interstitial
infiltrate, right middle lobe.  Questionable early evolving pneumonia.
 
ASSESSMENT:
1.  Possible pneumonia.
2.  Metastatic stage IV colon cancer, undergoing chemotherapy.
3.  Anemia of neoplastic disorder syncope.
4.  Syncope.
5.  Questionable lesion in the left posterior occipital parietal region
extending into the cortex, probably representing focal cortical infarct. 
Metastatic focus is not definitely excluded.
 
PLAN:  I had detailed discussion with the patient.  We will continue broad
spectrum antibiotics.  We will wait for the blood cultures to come back.  He may
need an MRI of the head if his dizziness does not improve.  Follow for now. 
Review old records.  Depending on the above, further intervention and discussion
will follow.
 
Thanks for consulting and letting me participate in the care of this interesting
patient.
 
 
 
_________________________________
GERA THAO MD
 
CM:CONSTR:REPORT OF CONSULTATION
 
D: 10/26/17 0907   T: 10/26/17                           
10/26/17     2030                                          interface

## 2017-10-26 VITALS — DIASTOLIC BLOOD PRESSURE: 54 MMHG

## 2017-10-26 VITALS — SYSTOLIC BLOOD PRESSURE: 105 MMHG | DIASTOLIC BLOOD PRESSURE: 49 MMHG

## 2017-10-26 VITALS — SYSTOLIC BLOOD PRESSURE: 118 MMHG | DIASTOLIC BLOOD PRESSURE: 48 MMHG

## 2017-10-26 VITALS — DIASTOLIC BLOOD PRESSURE: 50 MMHG

## 2017-10-26 VITALS — DIASTOLIC BLOOD PRESSURE: 58 MMHG

## 2017-10-26 LAB
25(OH)D3 SERPL-MCNC: 23.5 NG/ML (ref 30–100)
ALBUMIN SERPL-MCNC: 1.9 GM/DL (ref 3.1–4.5)
ALP SERPL-CCNC: 408 U/L (ref 45–117)
ALT SERPL W P-5'-P-CCNC: 40 U/L (ref 12–78)
AST SERPL-CCNC: 57 IU/L (ref 3–35)
BASOPHILS # BLD AUTO: 0 10*3/UL (ref 0–0.1)
BASOPHILS NFR BLD AUTO: 0.6 % (ref 0–1)
BUN SERPL-MCNC: 24 MG/DL (ref 7–24)
CHLORIDE SERPL-SCNC: 101 MMOL/L (ref 98–107)
CHOLEST SERPL-MCNC: 117 MG/DL (ref ?–200)
CREAT SERPL-MCNC: 0.89 MG/DL (ref 0.7–1.3)
EOSINOPHIL # BLD AUTO: 0 10*3/UL (ref 0–0.4)
EOSINOPHIL # BLD AUTO: 0.3 % (ref 1–4)
ERYTHROCYTE [DISTWIDTH] IN BLOOD BY AUTOMATED COUNT: 16 % (ref 0–14.5)
HCT VFR BLD AUTO: 27.1 % (ref 42–52)
HDLC SERPL-MCNC: 22 MG/DL (ref 40–60)
HGB BLD-MCNC: 8.6 G/DL (ref 14–18)
LDLC SERPL DIRECT ASSAY-MCNC: 67 MG/DL (ref 9–159)
LYMPHOCYTES # BLD AUTO: 1.3 10*3/UL (ref 1.3–4.4)
LYMPHOCYTES NFR BLD AUTO: 41.4 % (ref 27–41)
MCH RBC QN AUTO: 28.9 PG (ref 27–31)
MCHC RBC AUTO-ENTMCNC: 31.7 G/DL (ref 33–37)
MCV RBC AUTO: 90.9 FL (ref 80–94)
MONOCYTES # BLD AUTO: 0.1 10*3/UL (ref 0.1–1)
MONOCYTES NFR BLD MANUAL: 3.7 % (ref 3–9)
NEUT #: 1.7 10*3/UL (ref 2.3–7.9)
NEUT %: 53.7 % (ref 47–73)
NRBC BLD QL AUTO: 0 % (ref 0–0)
PHOSPHATE SERPL-MCNC: 4.3 MG/DL (ref 2.5–4.9)
PLATELET # BLD AUTO: 145 10*3/UL (ref 130–400)
PMV BLD AUTO: 11.3 FL (ref 9.6–12.3)
POTASSIUM SERPL-SCNC: 5.4 MMOL/L (ref 3.5–5.1)
PROT SERPL-MCNC: 6.6 GM/DL (ref 6.4–8.2)
RBC # BLD AUTO: 2.98 10*6/UL (ref 4.5–5.9)
SODIUM SERPL-SCNC: 136 MMOL/L (ref 136–145)
T4 FREE SERPL-MCNC: 1.13 NG/DL (ref 0.76–1.46)
TRIGL SERPL-MCNC: 139 MG/DL (ref ?–150)
TSH SERPL DL<=0.005 MIU/L-ACNC: 1.77 UIU/ML (ref 0.36–4.75)
VITAMIN B12: 640 PG/ML (ref 247–911)
VLDLC SERPL CALC-MCNC: 28 MG/DL (ref 6–40)
WBC NRBC COR # BLD AUTO: 3.2 10*3/UL (ref 4.8–10.8)

## 2017-10-26 NOTE — NUR
OT  evaluation completed on 5th floor with full evaluation to follow.
Precautions: IV, O2, fall risk, unsteady gait, acute debility. Patient with
moderate complexity level (69792). Patient with decreased safety and (I) for
ADL tasks. Recommend SNF if patient agreeable or HH occupational therapy.
Thank you for this referral. Skylar Lozano OTR/L

## 2017-10-26 NOTE — NUR
PHYSICAL THERAPY
 
Physical Therapy Evaluation completed this date. See eval document for further
details. Will begin PT intervention to address the impairments of muscle
weakness, decreased functional mobility I, and difficulty ambulating. Pnt
declines SNF, so recommend home with home health PT services as able. Pnt
currently has services thru [facility].
 
Complexity level: [place] at [id number] based on chart review and PT eval.
 
Brigitte Hager, PT

## 2017-10-26 NOTE — NUR
LEANNE ARNOLD [id number]
 
Please refer to the physician's history and physical for past medical history,
comorbid conditions, and allergies.
   Diagnosis: SYNCOPE,PNEUMONIA,DEHYDRATION
 
   Nasir Score: 17,AT RISK
 
WOUND DESCRIPTIONS:
Location of the wound: LLQ
Type of wound: SURGICAL
Thickness: Full
Size: 1.3CM X 1.2CM X 1.1CM
Tunneling: NONE
Undermining: NONE
Sinus Tract: NONE
Presence of Exudate: Serosanguineous
Amount: Light
Color: Red
Odor: None
Periwound Skin Appearance: Normal
Wound edges: APPROXIMATED
Pain (associated with wound): TENDER TO TOUCH
How does patient state this happened? PT STATED HE HAD A COLOSTOMY REVERSED TO
AN ILEOSTOMY 1 1/2 YEARS AGO. HE STATED THAT HE DID FOLLOW UP IN THE WOUND CARE
CENTER FOR THE MIDLINE INCISION NOT THE INCISION THAT HE HAS NOW. HE STATED HIS
INSURANCE CHANGED AND HE WAS UNABLE TO CONTINUE FOLLOWING UP IN THE WOUND CARE
CENTER BUT HIS INSURANCE IS CHANGED BACK AND WANTS TO FOLLOW UP DOWNSTAIRS.
 
Location of the wound: LEFT MEDIAL FOOT
Type of wound: UNSTAGEABLE
Thickness: Full
Size: 0.1CM X 0.1CM X <0.1CM
Tunneling: NONE
Undermining: NONE
Sinus Tract: NONE
Presence of Exudate: NONE
Amount: None
Color: Brown
Odor: None
Periwound Skin Appearance: Normal
Wound edges: CLOSED
Pain (associated with wound): NONE AT TIME OF ASSESSMENT
How does patient state this happened? PT STATED HE HAD BLOOD CLOTS AND LOST
ALL HIS TOES WHEN HE WAS 37 YEARS OLD.
 
Location of the wound: LATERAL LEFT FOOT
Type of wound: UNSTAGEABLE
Thickness: Full
Size: 0.3CM X 0.2CM X <0.1CM
Tunneling: NONE
Undermining: NONE
Sinus Tract: NONE
Presence of Exudate: NONE
Amount: None
Color: Brown
Odor: None
Periwound Skin Appearance: Normal
Wound edges: CLOSED
Pain (associated with wound): NONE AT TIME OF ASSESSMENT
How does patient state this happened? PT STATED HE HAD BLOOD CLOTS AND HAD
SURGERY WHEN HE WAS 37 AND LOST ALL OF HIS TOES.
   Surface the patient is resting on: Isoflex
 
SKIN PREVENTION RECOMMENDATION:
   1.  Pressure redistribution support surface as appropriate
   2.  Elevate heels
   3.  Remove boots/TEDS every shift and reapply
   4.  Head of bed 30 degrees as tolerated
   5.  Assess nutrition and hydration
   6.  Manage moisture
   7.  Avoid the use of containment devices while in bed
   8.  Use absorptive products on surfaces limit layers of linens on bed
   9.  Turn and reposition every 1-2 hours in bed and every 1 hour in chair as
       tolerated
   10. Weight shifts every 15 minutes while up in chair
   11. Offloading with pillows or device to keep heels elevated off bed
   12. Monitor skin at least every shift
   13. Inspect under medical devices twice a day
 
WOUND TREATMENT RECOMMENDATIONS:
CONSULT DR. DOE FOR WOUND CARE RECOMMENDATION SINCE PATIENT WANTS TO FOLLOW
UP IN THE WOUND CARE CENTER.

## 2017-10-26 NOTE — NUR
C/O 8/10 PAIN FROM BILATERL HIPS TO BILATERAL FEET. MEDICATED WITH PRN
MORPHINE AS ORDERED AND PER PATIENT REQUEST.

## 2017-10-26 NOTE — NUR
RESTING IN BED WITH HOB SLIGHTLY ELEVATED.  02 INTACT AT 2LPM VIA NASAL
CANNULA.  LUNGS CLEAR & DIMINISHED; NO COUGH NOTED.  PT. VOICES NO C/O PAIN OR
DISCOMFORT AT THIS TIME.  CALL LIGHT WITHIN REACH.

## 2017-10-26 NOTE — NUR
in to talk to patient.
Patient states lives at home with wife.
There are few steps in the home.
Physician: anali cummings
Pharmacy: [facility] health services: [facility]
Patient's level of ADLs: MINIMAL ASSIST
Patient has working utilities: all working
DME: cane
Follow-up physician's appointment after d/c: will be made by hospitalist nurse
director upon discharge
Does patient want to access PORTAL?: no
Discharge plan discussed with patient, patient lives at home with wife, states
he has a cane to use for ambulation, patient drives, he states he is receiving
chemo two days a week and has [facility] health, discussed with him a short
term skilled nursing and patient stated he would be returning home and wanted
home health to continue, discharge planner will notify [facility] when
patient is medically stable for discsharge.
 
VARINDER LAINEZ

## 2017-10-26 NOTE — NUR
BLOOD SUGAR 130; NO COVERAGE REQUIRED AT THIS TIME.  PT. TOOK PO MEDICATIONS
WITHOUT DIFFICULTY.  CALL LIGHT WITHIN REACH.

## 2017-10-27 VITALS — DIASTOLIC BLOOD PRESSURE: 57 MMHG | SYSTOLIC BLOOD PRESSURE: 114 MMHG

## 2017-10-27 VITALS — SYSTOLIC BLOOD PRESSURE: 98 MMHG | DIASTOLIC BLOOD PRESSURE: 62 MMHG

## 2017-10-27 VITALS — DIASTOLIC BLOOD PRESSURE: 64 MMHG | SYSTOLIC BLOOD PRESSURE: 111 MMHG

## 2017-10-27 VITALS — SYSTOLIC BLOOD PRESSURE: 91 MMHG | DIASTOLIC BLOOD PRESSURE: 55 MMHG

## 2017-10-27 VITALS — DIASTOLIC BLOOD PRESSURE: 60 MMHG

## 2017-10-27 LAB
ALBUMIN SERPL-MCNC: 1.7 GM/DL (ref 3.1–4.5)
ALP SERPL-CCNC: 473 U/L (ref 45–117)
ALT SERPL W P-5'-P-CCNC: 56 U/L (ref 12–78)
AST SERPL-CCNC: 79 IU/L (ref 3–35)
BASOPHILS # BLD AUTO: 0 10*3/UL (ref 0–0.1)
BASOPHILS NFR BLD AUTO: 0.6 % (ref 0–1)
BUN SERPL-MCNC: 16 MG/DL (ref 7–24)
CHLORIDE SERPL-SCNC: 100 MMOL/L (ref 98–107)
CREAT SERPL-MCNC: 0.9 MG/DL (ref 0.7–1.3)
EOSINOPHIL # BLD AUTO: 0 10*3/UL (ref 0–0.4)
EOSINOPHIL # BLD AUTO: 0.9 % (ref 1–4)
ERYTHROCYTE [DISTWIDTH] IN BLOOD BY AUTOMATED COUNT: 15.9 % (ref 0–14.5)
HCT VFR BLD AUTO: 25.1 % (ref 42–52)
HGB BLD-MCNC: 7.8 G/DL (ref 14–18)
LYMPHOCYTES # BLD AUTO: 1.3 10*3/UL (ref 1.3–4.4)
LYMPHOCYTES NFR BLD AUTO: 36.1 % (ref 27–41)
MCH RBC QN AUTO: 28.6 PG (ref 27–31)
MCHC RBC AUTO-ENTMCNC: 31.1 G/DL (ref 33–37)
MCV RBC AUTO: 91.9 FL (ref 80–94)
MONOCYTES # BLD AUTO: 0.1 10*3/UL (ref 0.1–1)
MONOCYTES NFR BLD MANUAL: 1.7 % (ref 3–9)
NEUT #: 2.1 10*3/UL (ref 2.3–7.9)
NEUT %: 60.4 % (ref 47–73)
NRBC BLD QL AUTO: 0 % (ref 0–0)
PLATELET # BLD AUTO: 127 10*3/UL (ref 130–400)
PMV BLD AUTO: 11.8 FL (ref 9.6–12.3)
POTASSIUM SERPL-SCNC: 4.4 MMOL/L (ref 3.5–5.1)
PROT SERPL-MCNC: 6.5 GM/DL (ref 6.4–8.2)
RBC # BLD AUTO: 2.73 10*6/UL (ref 4.5–5.9)
SODIUM SERPL-SCNC: 137 MMOL/L (ref 136–145)
WBC NRBC COR # BLD AUTO: 3.5 10*3/UL (ref 4.8–10.8)

## 2017-10-27 NOTE — NUR
PHYSICAL THERAPY
 
Back this AM to see Davidson. All transfers were supervision X 1, no LOB with Pt
putting his shoes on. Followed by gait with his straight cane 90' X 1, CG X 1,
no LOB and little verbal cueing for gait safety, one sitting rest. End with
act Ex to bilateral LE in sitting of LAQ's, marching, and ankle pumps with
little verbal cueing for his Ex X 20 reps each, Pt with his call light sitting
independent at bedside and did a very good job this visit. Said that he is
going home with D/C from the hospital.
ARNAUD CORDOVA PTA.

## 2017-10-27 NOTE — NUR
ADMINISTERED IV MORPHINE PER PT REQUEST FOR BILAT FOOT PAIN RATED 9/10. WILL
MONITOR FOR EFFECTIVENESS.

## 2017-10-27 NOTE — NUR
DR WITT CALLED AND ASKED IF DR DOE HAD SEEN THE PATIENT YET. I EXPLAINED
THAT HE HAD NOT TODAY. THIS NURSE CALLED WOUND CLINIC AND LEFT MESSAGE. DR WITT NOTIFIED

## 2017-10-27 NOTE — NUR
PHYSICAL THERAPY
 
Pt seen this AM 1:1 for his therapy session. Pt eating his breakfast, stopped
back and his floor was wet. Will check back later.
ARNAUD CORDOVA PTA.

## 2017-10-27 NOTE — NUR
PATIENT SEEN FOR 25 MINUTES 1:1 THIS DATE. PATIENT IDENTIFIED BY NAME AND DATE
OF BIRTH.  PATIENT IN BED UPON ARRIVAL. PATIENT COMPLETED SUPINE TO SIT EOB
SBA. PATIENT EDUCATED PROPER FOOT WEAR FOR FALL PREVENTION WITH STANDING.
PATIENT COMPLETED LB DRESSING DONNING NON SLIP SOCKS AND SHOES SBA AFTER ROSA.
PATIENT COMPLETED FUNCTIONAL XFER TRAINING SIT TO STAND ARM CHAIR/ BED X 5
TRIALS CGA WITH FAIR SAFETY AND VERBAL CUES REQUIRED PROPER HAND PLACEMENT.
COMPLETED STAND PIVOT XFER BED TO ARM CHAIR AND BACK CGA USE FWW AND MOD
VERBAL CUES SAFETY. PATIENT COMPLETED STAND TOLERANCE ACTIVTY USE FWW SUPPORT
CGA X 3 TRIALS 2 MINUTE TOLERANCE WITH VERBAL CUES INCREASE STANCE. PATIENT
C/O MINIMAL FATIGUE AFTER SESSION THIS DATE. PATIENT REQUESTED SIT TO SUPINE
BED SBA AND DOFFED SHOES SBA. CALL LIGHT WITHIN REACH.
 
JAKOB MARADIAGA/L

## 2017-10-27 NOTE — NUR
Discharge instructions reviewed with patient/family. Patient receptive and
verbalizes understanding. Follow-up care arranged. Written instructions given
to patient/family. PT INITIALLY AGREED TO WOUND PHOTOS. WHEN THIS NURSE WENT
IN TO TAKE WOUND PHOTOS AND DISCHARGE HIM HE WAS FULLY DRESSED AND SAID HE
"FORGOT" HE THEN DECLINED TO GET UNDRESSED FOR PHOTOS STATING "HEY YOU JUST
CLEANED IT AND PUT A NEW BANDAGE ON IT ANYWAY." PRINTED LARGE CLEAR
INSTRUCTIONS ON DC PAPERWORK (VERY SPECIFIC) REGARDING DAILY WOUND CLEANSING
AND DRESSING CHANGES.  KADEN LESTER

## 2017-10-27 NOTE — NUR
RESTING IN BED; AROUSES EASILY WHEN ENTERING THE ROOM.  PT. VOICES NO C/O AT
THIS TIME.  CALL LIGHT WITHIN REACH.

## 2017-10-27 NOTE — NUR
Patient resting quietly with no c/o discomfort. Respirations easy and regular.
Vital signs stable. No overt distress.
KADEN LESTER

## 2017-10-27 NOTE — NUR
case management visits with patient, patient will be going home when able and
will resume [facility] home health. discharge planner will notify home health
when patient is medically stable for discharge

## 2017-10-28 ENCOUNTER — HOSPITAL ENCOUNTER (INPATIENT)
Dept: HOSPITAL 83 - ED | Age: 57
LOS: 2 days | Discharge: LEFT BEFORE DISCHARGE | DRG: 908 | End: 2017-10-30
Attending: INTERNAL MEDICINE | Admitting: INTERNAL MEDICINE
Payer: COMMERCIAL

## 2017-10-28 VITALS — DIASTOLIC BLOOD PRESSURE: 56 MMHG | SYSTOLIC BLOOD PRESSURE: 100 MMHG

## 2017-10-28 VITALS — DIASTOLIC BLOOD PRESSURE: 50 MMHG

## 2017-10-28 VITALS — SYSTOLIC BLOOD PRESSURE: 80 MMHG | DIASTOLIC BLOOD PRESSURE: 49 MMHG

## 2017-10-28 VITALS — DIASTOLIC BLOOD PRESSURE: 55 MMHG

## 2017-10-28 VITALS — SYSTOLIC BLOOD PRESSURE: 99 MMHG | DIASTOLIC BLOOD PRESSURE: 50 MMHG

## 2017-10-28 VITALS — DIASTOLIC BLOOD PRESSURE: 41 MMHG | SYSTOLIC BLOOD PRESSURE: 96 MMHG

## 2017-10-28 VITALS — DIASTOLIC BLOOD PRESSURE: 53 MMHG

## 2017-10-28 VITALS — BODY MASS INDEX: 22.5 KG/M2 | HEIGHT: 66 IN | WEIGHT: 140 LBS

## 2017-10-28 VITALS — DIASTOLIC BLOOD PRESSURE: 54 MMHG

## 2017-10-28 VITALS — DIASTOLIC BLOOD PRESSURE: 60 MMHG | SYSTOLIC BLOOD PRESSURE: 90 MMHG

## 2017-10-28 VITALS — DIASTOLIC BLOOD PRESSURE: 50 MMHG | SYSTOLIC BLOOD PRESSURE: 103 MMHG

## 2017-10-28 VITALS — DIASTOLIC BLOOD PRESSURE: 36 MMHG

## 2017-10-28 VITALS — DIASTOLIC BLOOD PRESSURE: 48 MMHG

## 2017-10-28 VITALS — DIASTOLIC BLOOD PRESSURE: 41 MMHG | SYSTOLIC BLOOD PRESSURE: 100 MMHG

## 2017-10-28 DIAGNOSIS — D62: ICD-10-CM

## 2017-10-28 DIAGNOSIS — K43.2: ICD-10-CM

## 2017-10-28 DIAGNOSIS — Z79.899: ICD-10-CM

## 2017-10-28 DIAGNOSIS — E11.65: ICD-10-CM

## 2017-10-28 DIAGNOSIS — D69.6: ICD-10-CM

## 2017-10-28 DIAGNOSIS — Z82.3: ICD-10-CM

## 2017-10-28 DIAGNOSIS — Z53.21: ICD-10-CM

## 2017-10-28 DIAGNOSIS — Z79.84: ICD-10-CM

## 2017-10-28 DIAGNOSIS — Z82.49: ICD-10-CM

## 2017-10-28 DIAGNOSIS — C18.9: ICD-10-CM

## 2017-10-28 DIAGNOSIS — K91.840: Primary | ICD-10-CM

## 2017-10-28 DIAGNOSIS — Z83.3: ICD-10-CM

## 2017-10-28 DIAGNOSIS — Z99.81: ICD-10-CM

## 2017-10-28 DIAGNOSIS — E11.51: ICD-10-CM

## 2017-10-28 DIAGNOSIS — I95.9: ICD-10-CM
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
## 2017-10-28 DIAGNOSIS — N40.0: ICD-10-CM

## 2017-10-28 DIAGNOSIS — C78.7: ICD-10-CM

## 2017-10-28 DIAGNOSIS — Z72.89: ICD-10-CM

## 2017-10-28 DIAGNOSIS — E78.00: ICD-10-CM

## 2017-10-28 DIAGNOSIS — E11.649: ICD-10-CM

## 2017-10-28 DIAGNOSIS — J44.9: ICD-10-CM

## 2017-10-28 DIAGNOSIS — F17.210: ICD-10-CM

## 2017-10-28 DIAGNOSIS — E87.5: ICD-10-CM

## 2017-10-28 DIAGNOSIS — I10: ICD-10-CM

## 2017-10-28 DIAGNOSIS — Z79.82: ICD-10-CM

## 2017-10-28 DIAGNOSIS — S31.109A: ICD-10-CM

## 2017-10-28 DIAGNOSIS — R14.0: ICD-10-CM

## 2017-10-28 DIAGNOSIS — R00.0: ICD-10-CM

## 2017-10-28 DIAGNOSIS — I86.8: ICD-10-CM

## 2017-10-28 DIAGNOSIS — E86.0: ICD-10-CM
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
## 2017-10-28 DIAGNOSIS — R74.0: ICD-10-CM

## 2017-10-28 DIAGNOSIS — Z89.429: ICD-10-CM

## 2017-10-28 DIAGNOSIS — R65.10: ICD-10-CM

## 2017-10-28 DIAGNOSIS — T81.31XA: ICD-10-CM

## 2017-10-28 LAB
ALBUMIN SERPL-MCNC: 1.8 GM/DL (ref 3.1–4.5)
ALP SERPL-CCNC: 439 U/L (ref 45–117)
ALT SERPL W P-5'-P-CCNC: 47 U/L (ref 12–78)
APTT PPP: 30.6 SECONDS (ref 20.8–31.5)
AST SERPL-CCNC: 49 IU/L (ref 3–35)
BASOPHILS # BLD AUTO: 0 10*3/UL (ref 0–0.1)
BASOPHILS NFR BLD AUTO: 0.3 % (ref 0–1)
BUN SERPL-MCNC: 17 MG/DL (ref 7–24)
CHLORIDE SERPL-SCNC: 105 MMOL/L (ref 98–107)
CREAT SERPL-MCNC: 1 MG/DL (ref 0.7–1.3)
EOSINOPHIL # BLD AUTO: 0.3 10*3/UL (ref 0–0.4)
EOSINOPHIL # BLD AUTO: 3.4 % (ref 1–4)
ERYTHROCYTE [DISTWIDTH] IN BLOOD BY AUTOMATED COUNT: 15.9 % (ref 0–14.5)
HCT VFR BLD AUTO: 21.6 % (ref 42–52)
HGB BLD-MCNC: 6.9 G/DL (ref 14–18)
INR BLD: 1.1 (ref 2–3.5)
LYMPHOCYTES # BLD AUTO: 2.4 10*3/UL (ref 1.3–4.4)
LYMPHOCYTES NFR BLD AUTO: 30.8 % (ref 27–41)
MCH RBC QN AUTO: 29.4 PG (ref 27–31)
MCHC RBC AUTO-ENTMCNC: 31.9 G/DL (ref 33–37)
MCV RBC AUTO: 91.9 FL (ref 80–94)
MONOCYTES # BLD AUTO: 0.2 10*3/UL (ref 0.1–1)
MONOCYTES NFR BLD MANUAL: 2.2 % (ref 3–9)
NEUT #: 4.8 10*3/UL (ref 2.3–7.9)
NEUT %: 62.8 % (ref 47–73)
NRBC BLD QL AUTO: 0 % (ref 0–0)
PLATELET # BLD AUTO: 158 10*3/UL (ref 130–400)
PMV BLD AUTO: 11.5 FL (ref 9.6–12.3)
POTASSIUM SERPL-SCNC: 5.5 MMOL/L (ref 3.5–5.1)
PROT SERPL-MCNC: 6.2 GM/DL (ref 6.4–8.2)
RBC # BLD AUTO: 2.35 10*6/UL (ref 4.5–5.9)
SODIUM SERPL-SCNC: 138 MMOL/L (ref 136–145)
WBC NRBC COR # BLD AUTO: 7.7 10*3/UL (ref 4.8–10.8)

## 2017-10-28 PROCEDURE — 3E013BZ INTRODUCTION OF ANESTHETIC AGENT INTO SUBCUTANEOUS TISSUE, PERCUTANEOUS APPROACH: ICD-10-PCS | Performed by: INTERNAL MEDICINE

## 2017-10-28 PROCEDURE — 0W3F3ZZ CONTROL BLEEDING IN ABDOMINAL WALL, PERCUTANEOUS APPROACH: ICD-10-PCS | Performed by: INTERNAL MEDICINE

## 2017-10-28 PROCEDURE — 3E013GC INTRODUCTION OF OTHER THERAPEUTIC SUBSTANCE INTO SUBCUTANEOUS TISSUE, PERCUTANEOUS APPROACH: ICD-10-PCS | Performed by: INTERNAL MEDICINE

## 2017-10-28 PROCEDURE — 30233N1 TRANSFUSION OF NONAUTOLOGOUS RED BLOOD CELLS INTO PERIPHERAL VEIN, PERCUTANEOUS APPROACH: ICD-10-PCS | Performed by: INTERNAL MEDICINE

## 2017-10-29 VITALS — SYSTOLIC BLOOD PRESSURE: 128 MMHG | DIASTOLIC BLOOD PRESSURE: 60 MMHG

## 2017-10-29 VITALS — DIASTOLIC BLOOD PRESSURE: 67 MMHG

## 2017-10-29 VITALS — DIASTOLIC BLOOD PRESSURE: 56 MMHG

## 2017-10-29 VITALS — DIASTOLIC BLOOD PRESSURE: 60 MMHG

## 2017-10-29 VITALS — DIASTOLIC BLOOD PRESSURE: 60 MMHG | SYSTOLIC BLOOD PRESSURE: 119 MMHG

## 2017-10-29 VITALS — DIASTOLIC BLOOD PRESSURE: 49 MMHG | SYSTOLIC BLOOD PRESSURE: 109 MMHG

## 2017-10-29 VITALS — DIASTOLIC BLOOD PRESSURE: 65 MMHG

## 2017-10-29 VITALS — DIASTOLIC BLOOD PRESSURE: 52 MMHG

## 2017-10-29 VITALS — SYSTOLIC BLOOD PRESSURE: 110 MMHG | DIASTOLIC BLOOD PRESSURE: 59 MMHG

## 2017-10-29 VITALS — SYSTOLIC BLOOD PRESSURE: 121 MMHG | DIASTOLIC BLOOD PRESSURE: 57 MMHG

## 2017-10-29 VITALS — DIASTOLIC BLOOD PRESSURE: 64 MMHG | SYSTOLIC BLOOD PRESSURE: 123 MMHG

## 2017-10-29 VITALS — DIASTOLIC BLOOD PRESSURE: 68 MMHG

## 2017-10-29 LAB
APTT PPP: 30.2 SECONDS (ref 20.8–31.5)
BASOPHILS # BLD AUTO: 0 10*3/UL (ref 0–0.1)
BASOPHILS # BLD AUTO: 0 10*3/UL (ref 0–0.1)
BASOPHILS NFR BLD AUTO: 0.2 % (ref 0–1)
BASOPHILS NFR BLD AUTO: 0.4 % (ref 0–1)
BUN SERPL-MCNC: 13 MG/DL (ref 7–24)
CHLORIDE SERPL-SCNC: 107 MMOL/L (ref 98–107)
CREAT SERPL-MCNC: 0.83 MG/DL (ref 0.7–1.3)
EOSINOPHIL # BLD AUTO: 0.2 10*3/UL (ref 0–0.4)
EOSINOPHIL # BLD AUTO: 0.2 10*3/UL (ref 0–0.4)
EOSINOPHIL # BLD AUTO: 3.3 % (ref 1–4)
EOSINOPHIL # BLD AUTO: 3.4 % (ref 1–4)
ERYTHROCYTE [DISTWIDTH] IN BLOOD BY AUTOMATED COUNT: 15.3 % (ref 0–14.5)
ERYTHROCYTE [DISTWIDTH] IN BLOOD BY AUTOMATED COUNT: 15.9 % (ref 0–14.5)
HCT VFR BLD AUTO: 19.7 % (ref 42–52)
HCT VFR BLD AUTO: 23.8 % (ref 42–52)
HGB BLD-MCNC: 6.2 G/DL (ref 14–18)
HGB BLD-MCNC: 7.6 G/DL (ref 14–18)
INR BLD: 1.1 (ref 2–3.5)
LYMPHOCYTES # BLD AUTO: 1.5 10*3/UL (ref 1.3–4.4)
LYMPHOCYTES # BLD AUTO: 1.7 10*3/UL (ref 1.3–4.4)
LYMPHOCYTES NFR BLD AUTO: 32.2 % (ref 27–41)
LYMPHOCYTES NFR BLD AUTO: 32.8 % (ref 27–41)
MCH RBC QN AUTO: 29 PG (ref 27–31)
MCH RBC QN AUTO: 29.1 PG (ref 27–31)
MCHC RBC AUTO-ENTMCNC: 31.5 G/DL (ref 33–37)
MCHC RBC AUTO-ENTMCNC: 31.9 G/DL (ref 33–37)
MCV RBC AUTO: 91.2 FL (ref 80–94)
MCV RBC AUTO: 92.1 FL (ref 80–94)
MONOCYTES # BLD AUTO: 0.1 10*3/UL (ref 0.1–1)
MONOCYTES # BLD AUTO: 0.2 10*3/UL (ref 0.1–1)
MONOCYTES NFR BLD MANUAL: 2.8 % (ref 3–9)
MONOCYTES NFR BLD MANUAL: 3 % (ref 3–9)
NEUT #: 2.8 10*3/UL (ref 2.3–7.9)
NEUT #: 3.2 10*3/UL (ref 2.3–7.9)
NEUT %: 60.4 % (ref 47–73)
NEUT %: 60.5 % (ref 47–73)
NRBC BLD QL AUTO: 0 % (ref 0–0)
NRBC BLD QL AUTO: 0 10*3/UL (ref 0–0)
PLATELET # BLD AUTO: 103 10*3/UL (ref 130–400)
PLATELET # BLD AUTO: 97 10*3/UL (ref 130–400)
PMV BLD AUTO: 11.4 FL (ref 9.6–12.3)
PMV BLD AUTO: 12.1 FL (ref 9.6–12.3)
POTASSIUM SERPL-SCNC: 4.9 MMOL/L (ref 3.5–5.1)
RBC # BLD AUTO: 2.14 10*6/UL (ref 4.5–5.9)
RBC # BLD AUTO: 2.61 10*6/UL (ref 4.5–5.9)
SODIUM SERPL-SCNC: 138 MMOL/L (ref 136–145)
WBC NRBC COR # BLD AUTO: 4.6 10*3/UL (ref 4.8–10.8)
WBC NRBC COR # BLD AUTO: 5.3 10*3/UL (ref 4.8–10.8)

## 2017-10-29 PROCEDURE — 05LY0ZZ OCCLUSION OF UPPER VEIN, OPEN APPROACH: ICD-10-PCS

## 2017-10-30 VITALS — SYSTOLIC BLOOD PRESSURE: 111 MMHG | DIASTOLIC BLOOD PRESSURE: 46 MMHG

## 2017-10-30 VITALS — DIASTOLIC BLOOD PRESSURE: 49 MMHG | SYSTOLIC BLOOD PRESSURE: 107 MMHG

## 2017-10-30 VITALS — DIASTOLIC BLOOD PRESSURE: 48 MMHG

## 2017-10-30 VITALS — DIASTOLIC BLOOD PRESSURE: 50 MMHG

## 2017-10-30 VITALS — DIASTOLIC BLOOD PRESSURE: 53 MMHG

## 2017-10-30 VITALS — DIASTOLIC BLOOD PRESSURE: 46 MMHG | SYSTOLIC BLOOD PRESSURE: 111 MMHG

## 2017-10-30 LAB
ALBUMIN SERPL-MCNC: 1.6 GM/DL (ref 3.1–4.5)
ALP SERPL-CCNC: 344 U/L (ref 45–117)
ALT SERPL W P-5'-P-CCNC: 34 U/L (ref 12–78)
AST SERPL-CCNC: 41 IU/L (ref 3–35)
BASOPHILS # BLD AUTO: 0 10*3/UL (ref 0–0.1)
BASOPHILS NFR BLD AUTO: 0.2 % (ref 0–1)
BUN SERPL-MCNC: 8 MG/DL (ref 7–24)
CHLORIDE SERPL-SCNC: 107 MMOL/L (ref 98–107)
CREAT SERPL-MCNC: 0.74 MG/DL (ref 0.7–1.3)
EOSINOPHIL # BLD AUTO: 0.1 10*3/UL (ref 0–0.4)
EOSINOPHIL # BLD AUTO: 2.5 % (ref 1–4)
ERYTHROCYTE [DISTWIDTH] IN BLOOD BY AUTOMATED COUNT: 15.5 % (ref 0–14.5)
HCT VFR BLD AUTO: 21.7 % (ref 42–52)
HGB BLD-MCNC: 6.7 G/DL (ref 14–18)
LYMPHOCYTES # BLD AUTO: 1.7 10*3/UL (ref 1.3–4.4)
LYMPHOCYTES NFR BLD AUTO: 30.1 % (ref 27–41)
MCH RBC QN AUTO: 28.9 PG (ref 27–31)
MCHC RBC AUTO-ENTMCNC: 30.9 G/DL (ref 33–37)
MCV RBC AUTO: 93.5 FL (ref 80–94)
MONOCYTES # BLD AUTO: 0.3 10*3/UL (ref 0.1–1)
MONOCYTES NFR BLD MANUAL: 5.4 % (ref 3–9)
NEUT #: 3.4 10*3/UL (ref 2.3–7.9)
NEUT %: 61.1 % (ref 47–73)
NRBC BLD QL AUTO: 0 % (ref 0–0)
PLATELET # BLD AUTO: 86 10*3/UL (ref 130–400)
PMV BLD AUTO: 12.3 FL (ref 9.6–12.3)
POTASSIUM SERPL-SCNC: 4.4 MMOL/L (ref 3.5–5.1)
PROT SERPL-MCNC: 5.1 GM/DL (ref 6.4–8.2)
RBC # BLD AUTO: 2.32 10*6/UL (ref 4.5–5.9)
SODIUM SERPL-SCNC: 138 MMOL/L (ref 136–145)
WBC NRBC COR # BLD AUTO: 5.6 10*3/UL (ref 4.8–10.8)

## 2017-10-30 NOTE — NUR
PHYSICAL THERAPY CO-SIGN
 
 
I approve of the Phyical Therapy notes written above.
 
                                NNEKA TREADWELL PT

## 2017-10-30 NOTE — NUR
OCCUPATIONAL THERAPY CO-SIGN
 
I approve of the Occupational Therapy notes written above.
TRAVIS VASQUEZ OTR/TASHI

## 2017-11-09 ENCOUNTER — HOSPITAL ENCOUNTER (OUTPATIENT)
Dept: HOSPITAL 83 - WOUNDCARE | Age: 57
Discharge: HOME | End: 2017-11-09
Payer: COMMERCIAL

## 2017-11-09 DIAGNOSIS — F17.210: ICD-10-CM

## 2017-11-09 DIAGNOSIS — Z85.038: ICD-10-CM

## 2017-11-09 DIAGNOSIS — C78.7: ICD-10-CM
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
## 2017-11-09 DIAGNOSIS — I10: ICD-10-CM

## 2017-11-09 DIAGNOSIS — E78.00: ICD-10-CM

## 2017-11-09 DIAGNOSIS — E11.9: ICD-10-CM

## 2017-11-09 DIAGNOSIS — T81.89XA: Primary | ICD-10-CM

## 2017-11-16 ENCOUNTER — HOSPITAL ENCOUNTER (OUTPATIENT)
Dept: HOSPITAL 83 - WOUNDCARE | Age: 57
Discharge: HOME | End: 2017-11-16
Payer: COMMERCIAL

## 2017-11-16 DIAGNOSIS — C18.9: ICD-10-CM

## 2017-11-16 DIAGNOSIS — E11.9: ICD-10-CM

## 2017-11-16 DIAGNOSIS — T81.89XD: Primary | ICD-10-CM

## 2017-11-16 DIAGNOSIS — F17.210: ICD-10-CM

## 2017-11-16 DIAGNOSIS — C78.7: ICD-10-CM

## 2017-11-16 DIAGNOSIS — E78.00: ICD-10-CM

## 2017-11-16 DIAGNOSIS — K43.2: ICD-10-CM
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
## 2017-11-16 DIAGNOSIS — I10: ICD-10-CM

## 2017-12-15 ENCOUNTER — HOSPITAL ENCOUNTER (OUTPATIENT)
Dept: HOSPITAL 83 - CT | Age: 57
End: 2017-12-15
Attending: INTERNAL MEDICINE
Payer: COMMERCIAL

## 2017-12-15 VITALS — WEIGHT: 135 LBS | HEIGHT: 65.98 IN | BODY MASS INDEX: 21.69 KG/M2

## 2017-12-15 VITALS — DIASTOLIC BLOOD PRESSURE: 60 MMHG

## 2017-12-15 VITALS — SYSTOLIC BLOOD PRESSURE: 120 MMHG | DIASTOLIC BLOOD PRESSURE: 71 MMHG

## 2017-12-15 VITALS — SYSTOLIC BLOOD PRESSURE: 129 MMHG | DIASTOLIC BLOOD PRESSURE: 63 MMHG

## 2017-12-15 VITALS — DIASTOLIC BLOOD PRESSURE: 77 MMHG

## 2017-12-15 VITALS — DIASTOLIC BLOOD PRESSURE: 62 MMHG

## 2017-12-15 VITALS — DIASTOLIC BLOOD PRESSURE: 59 MMHG

## 2017-12-15 DIAGNOSIS — R16.0: ICD-10-CM

## 2017-12-15 DIAGNOSIS — C18.7: Primary | ICD-10-CM

## 2018-01-05 ENCOUNTER — HOSPITAL ENCOUNTER (INPATIENT)
Dept: HOSPITAL 83 - ED | Age: 58
LOS: 3 days | Discharge: HOME HEALTH | DRG: 682 | End: 2018-01-08
Attending: INTERNAL MEDICINE | Admitting: INTERNAL MEDICINE
Payer: COMMERCIAL

## 2018-01-05 VITALS — DIASTOLIC BLOOD PRESSURE: 49 MMHG

## 2018-01-05 VITALS — DIASTOLIC BLOOD PRESSURE: 46 MMHG | SYSTOLIC BLOOD PRESSURE: 97 MMHG

## 2018-01-05 VITALS — BODY MASS INDEX: 20.62 KG/M2 | HEIGHT: 66 IN | WEIGHT: 128.31 LBS

## 2018-01-05 VITALS — DIASTOLIC BLOOD PRESSURE: 43 MMHG

## 2018-01-05 VITALS — DIASTOLIC BLOOD PRESSURE: 58 MMHG

## 2018-01-05 VITALS — DIASTOLIC BLOOD PRESSURE: 52 MMHG

## 2018-01-05 VITALS — DIASTOLIC BLOOD PRESSURE: 57 MMHG

## 2018-01-05 DIAGNOSIS — Z80.1: ICD-10-CM

## 2018-01-05 DIAGNOSIS — Z79.82: ICD-10-CM

## 2018-01-05 DIAGNOSIS — Z93.2: ICD-10-CM

## 2018-01-05 DIAGNOSIS — J44.9: ICD-10-CM

## 2018-01-05 DIAGNOSIS — E11.65: ICD-10-CM

## 2018-01-05 DIAGNOSIS — Z72.89: ICD-10-CM

## 2018-01-05 DIAGNOSIS — Z80.8: ICD-10-CM

## 2018-01-05 DIAGNOSIS — M1A.9XX0: ICD-10-CM

## 2018-01-05 DIAGNOSIS — E83.52: ICD-10-CM

## 2018-01-05 DIAGNOSIS — Z81.1: ICD-10-CM

## 2018-01-05 DIAGNOSIS — Z79.899: ICD-10-CM

## 2018-01-05 DIAGNOSIS — Z86.711: ICD-10-CM

## 2018-01-05 DIAGNOSIS — E43: ICD-10-CM

## 2018-01-05 DIAGNOSIS — K85.90: ICD-10-CM

## 2018-01-05 DIAGNOSIS — Z81.2: ICD-10-CM

## 2018-01-05 DIAGNOSIS — N17.0: Primary | ICD-10-CM

## 2018-01-05 DIAGNOSIS — R74.0: ICD-10-CM

## 2018-01-05 DIAGNOSIS — E87.5: ICD-10-CM

## 2018-01-05 DIAGNOSIS — E87.8: ICD-10-CM

## 2018-01-05 DIAGNOSIS — N40.0: ICD-10-CM

## 2018-01-05 DIAGNOSIS — F17.200: ICD-10-CM

## 2018-01-05 DIAGNOSIS — I10: ICD-10-CM

## 2018-01-05 DIAGNOSIS — D53.9: ICD-10-CM

## 2018-01-05 DIAGNOSIS — Z93.0: ICD-10-CM

## 2018-01-05 DIAGNOSIS — R65.11: ICD-10-CM

## 2018-01-05 DIAGNOSIS — C78.7: ICD-10-CM

## 2018-01-05 DIAGNOSIS — C18.7: ICD-10-CM

## 2018-01-05 DIAGNOSIS — Z79.4: ICD-10-CM

## 2018-01-05 DIAGNOSIS — E11.51: ICD-10-CM

## 2018-01-05 DIAGNOSIS — Z86.718: ICD-10-CM

## 2018-01-05 LAB
ALBUMIN SERPL-MCNC: 2.5 GM/DL (ref 3.1–4.5)
ALP SERPL-CCNC: 959 U/L (ref 45–117)
ALT SERPL W P-5'-P-CCNC: 45 U/L (ref 12–78)
APPEARANCE UR: CLEAR
AST SERPL-CCNC: 67 IU/L (ref 3–35)
BACTERIA #/AREA URNS HPF: (no result) /[HPF]
BASOPHILS # BLD AUTO: 0.1 10*3/UL (ref 0–0.1)
BASOPHILS NFR BLD AUTO: 0.6 % (ref 0–1)
BILIRUB UR QL STRIP: (no result)
BUN SERPL-MCNC: 56 MG/DL (ref 7–24)
BUN SERPL-MCNC: 61 MG/DL (ref 7–24)
CASTS URNS QL MICRO: (no result)
CHLORIDE SERPL-SCNC: 107 MMOL/L (ref 98–107)
CHLORIDE SERPL-SCNC: 108 MMOL/L (ref 98–107)
CK SERPL-CCNC: 61 U/L (ref 39–308)
COLOR UR: YELLOW
CREAT SERPL-MCNC: 2.62 MG/DL (ref 0.7–1.3)
CREAT SERPL-MCNC: 2.82 MG/DL (ref 0.7–1.3)
EOSINOPHIL # BLD AUTO: 0.2 10*3/UL (ref 0–0.4)
EOSINOPHIL # BLD AUTO: 1.4 % (ref 1–4)
EPI CELLS #/AREA URNS HPF: (no result) /[HPF]
ERYTHROCYTE [DISTWIDTH] IN BLOOD BY AUTOMATED COUNT: 17.9 % (ref 0–14.5)
GLUCOSE UR QL: NEGATIVE
HCT VFR BLD AUTO: 28 % (ref 42–52)
HGB BLD-MCNC: 8.6 G/DL (ref 14–18)
HGB UR QL STRIP: (no result)
KETONES UR QL STRIP: NEGATIVE
LEUKOCYTE ESTERASE UR QL STRIP: NEGATIVE
LIPASE SERPL-CCNC: 757 U/L (ref 73–393)
LYMPHOCYTES # BLD AUTO: 2.1 10*3/UL (ref 1.3–4.4)
LYMPHOCYTES NFR BLD AUTO: 13.4 % (ref 27–41)
MCH RBC QN AUTO: 29.6 PG (ref 27–31)
MCHC RBC AUTO-ENTMCNC: 30.7 G/DL (ref 33–37)
MCV RBC AUTO: 96.2 FL (ref 80–94)
MONOCYTES # BLD AUTO: 1.3 10*3/UL (ref 0.1–1)
MONOCYTES NFR BLD MANUAL: 8.3 % (ref 3–9)
NEUT #: 11.8 10*3/UL (ref 2.3–7.9)
NEUT %: 75.7 % (ref 47–73)
NITRITE UR QL STRIP: NEGATIVE
NRBC BLD QL AUTO: 0 10*3/UL (ref 0–0)
PH UR STRIP: 5 [PH] (ref 5–9)
PLATELET # BLD AUTO: 193 10*3/UL (ref 130–400)
PMV BLD AUTO: 10.8 FL (ref 9.6–12.3)
POTASSIUM SERPL-SCNC: 6.2 MMOL/L (ref 3.5–5.1)
POTASSIUM SERPL-SCNC: 6.6 MMOL/L (ref 3.5–5.1)
PROT SERPL-MCNC: 8.3 GM/DL (ref 6.4–8.2)
RBC # BLD AUTO: 2.91 10*6/UL (ref 4.5–5.9)
RBC #/AREA URNS HPF: (no result) RBC/HPF (ref 0–2)
SODIUM SERPL-SCNC: 136 MMOL/L (ref 136–145)
SODIUM SERPL-SCNC: 136 MMOL/L (ref 136–145)
SP GR UR: 1.02 (ref 1–1.03)
TROPONIN I SERPL-MCNC: < 0.015 NG/ML (ref ?–0.04)
UROBILINOGEN UR STRIP-MCNC: 0.2 E.U./DL (ref 0.2–1)
WBC #/AREA URNS HPF: (no result) WBC/HPF (ref 0–5)
WBC NRBC COR # BLD AUTO: 15.5 10*3/UL (ref 4.8–10.8)

## 2018-01-06 VITALS — SYSTOLIC BLOOD PRESSURE: 91 MMHG | DIASTOLIC BLOOD PRESSURE: 53 MMHG

## 2018-01-06 VITALS — SYSTOLIC BLOOD PRESSURE: 102 MMHG | DIASTOLIC BLOOD PRESSURE: 52 MMHG

## 2018-01-06 VITALS — DIASTOLIC BLOOD PRESSURE: 51 MMHG | SYSTOLIC BLOOD PRESSURE: 96 MMHG

## 2018-01-06 VITALS — DIASTOLIC BLOOD PRESSURE: 50 MMHG

## 2018-01-06 VITALS — DIASTOLIC BLOOD PRESSURE: 52 MMHG | SYSTOLIC BLOOD PRESSURE: 100 MMHG

## 2018-01-06 LAB
25(OH)D3 SERPL-MCNC: 79.6 NG/ML (ref 30–100)
ALBUMIN SERPL-MCNC: 2.2 GM/DL (ref 3.1–4.5)
ALP SERPL-CCNC: 777 U/L (ref 45–117)
ALT SERPL W P-5'-P-CCNC: 34 U/L (ref 12–78)
APTT PPP: 30.6 SECONDS (ref 20.8–31.5)
AST SERPL-CCNC: 47 IU/L (ref 3–35)
BASOPHILS # BLD AUTO: 0.1 10*3/UL (ref 0–0.1)
BASOPHILS NFR BLD AUTO: 0.5 % (ref 0–1)
BUN SERPL-MCNC: 48 MG/DL (ref 7–24)
CHLORIDE SERPL-SCNC: 112 MMOL/L (ref 98–107)
CREAT SERPL-MCNC: 1.84 MG/DL (ref 0.7–1.3)
EOSINOPHIL # BLD AUTO: 0.2 10*3/UL (ref 0–0.4)
EOSINOPHIL # BLD AUTO: 1.6 % (ref 1–4)
ERYTHROCYTE [DISTWIDTH] IN BLOOD BY AUTOMATED COUNT: 18 % (ref 0–14.5)
HCT VFR BLD AUTO: 24.7 % (ref 42–52)
HGB BLD-MCNC: 7.5 G/DL (ref 14–18)
INR BLD: 1 (ref 2–3.5)
LYMPHOCYTES # BLD AUTO: 2.1 10*3/UL (ref 1.3–4.4)
LYMPHOCYTES NFR BLD AUTO: 19.9 % (ref 27–41)
MCH RBC QN AUTO: 29.8 PG (ref 27–31)
MCHC RBC AUTO-ENTMCNC: 30.4 G/DL (ref 33–37)
MCV RBC AUTO: 98 FL (ref 80–94)
MONOCYTES # BLD AUTO: 0.9 10*3/UL (ref 0.1–1)
MONOCYTES NFR BLD MANUAL: 8.4 % (ref 3–9)
NEUT #: 7.1 10*3/UL (ref 2.3–7.9)
NEUT %: 69.1 % (ref 47–73)
NRBC BLD QL AUTO: 0 10*3/UL (ref 0–0)
PHOSPHATE SERPL-MCNC: 4.1 MG/DL (ref 2.5–4.9)
PLATELET # BLD AUTO: 153 10*3/UL (ref 130–400)
PMV BLD AUTO: 11.9 FL (ref 9.6–12.3)
POTASSIUM SERPL-SCNC: 6 MMOL/L (ref 3.5–5.1)
PROT SERPL-MCNC: 7.2 GM/DL (ref 6.4–8.2)
RBC # BLD AUTO: 2.52 10*6/UL (ref 4.5–5.9)
SODIUM SERPL-SCNC: 141 MMOL/L (ref 136–145)
T4 FREE SERPL-MCNC: 0.99 NG/DL (ref 0.76–1.46)
TSH SERPL DL<=0.005 MIU/L-ACNC: 1.44 UIU/ML (ref 0.36–4.75)
VITAMIN B12: 958 PG/ML (ref 247–911)
WBC NRBC COR # BLD AUTO: 10.3 10*3/UL (ref 4.8–10.8)

## 2018-01-07 VITALS — SYSTOLIC BLOOD PRESSURE: 100 MMHG | DIASTOLIC BLOOD PRESSURE: 54 MMHG

## 2018-01-07 VITALS — DIASTOLIC BLOOD PRESSURE: 56 MMHG | SYSTOLIC BLOOD PRESSURE: 100 MMHG

## 2018-01-07 VITALS — DIASTOLIC BLOOD PRESSURE: 50 MMHG

## 2018-01-07 VITALS — DIASTOLIC BLOOD PRESSURE: 48 MMHG | SYSTOLIC BLOOD PRESSURE: 97 MMHG

## 2018-01-07 VITALS — SYSTOLIC BLOOD PRESSURE: 98 MMHG | DIASTOLIC BLOOD PRESSURE: 50 MMHG

## 2018-01-07 LAB
BASOPHILS # BLD AUTO: 0 10*3/UL (ref 0–0.1)
BASOPHILS # BLD AUTO: 0 10*3/UL (ref 0–0.1)
BASOPHILS NFR BLD AUTO: 0.1 % (ref 0–1)
BASOPHILS NFR BLD AUTO: 0.4 % (ref 0–1)
BUN SERPL-MCNC: 31 MG/DL (ref 7–24)
CHLORIDE SERPL-SCNC: 113 MMOL/L (ref 98–107)
CREAT SERPL-MCNC: 1.11 MG/DL (ref 0.7–1.3)
EOSINOPHIL # BLD AUTO: 0.2 10*3/UL (ref 0–0.4)
EOSINOPHIL # BLD AUTO: 0.2 10*3/UL (ref 0–0.4)
EOSINOPHIL # BLD AUTO: 1.6 % (ref 1–4)
EOSINOPHIL # BLD AUTO: 2 % (ref 1–4)
ERYTHROCYTE [DISTWIDTH] IN BLOOD BY AUTOMATED COUNT: 17.9 % (ref 0–14.5)
ERYTHROCYTE [DISTWIDTH] IN BLOOD BY AUTOMATED COUNT: 18.2 % (ref 0–14.5)
HCT VFR BLD AUTO: 20.6 % (ref 42–52)
HCT VFR BLD AUTO: 24.6 % (ref 42–52)
HGB BLD-MCNC: 6.3 G/DL (ref 14–18)
HGB BLD-MCNC: 7.7 G/DL (ref 14–18)
LIPASE SERPL-CCNC: 268 U/L (ref 73–393)
LYMPHOCYTES # BLD AUTO: 1.6 10*3/UL (ref 1.3–4.4)
LYMPHOCYTES # BLD AUTO: 1.7 10*3/UL (ref 1.3–4.4)
LYMPHOCYTES NFR BLD AUTO: 16.3 % (ref 27–41)
LYMPHOCYTES NFR BLD AUTO: 20.9 % (ref 27–41)
MCH RBC QN AUTO: 29.6 PG (ref 27–31)
MCH RBC QN AUTO: 29.7 PG (ref 27–31)
MCHC RBC AUTO-ENTMCNC: 30.6 G/DL (ref 33–37)
MCHC RBC AUTO-ENTMCNC: 31.3 G/DL (ref 33–37)
MCV RBC AUTO: 95 FL (ref 80–94)
MCV RBC AUTO: 96.7 FL (ref 80–94)
MONOCYTES # BLD AUTO: 0.7 10*3/UL (ref 0.1–1)
MONOCYTES # BLD AUTO: 0.8 10*3/UL (ref 0.1–1)
MONOCYTES NFR BLD MANUAL: 8.3 % (ref 3–9)
MONOCYTES NFR BLD MANUAL: 8.7 % (ref 3–9)
NEUT #: 5.3 10*3/UL (ref 2.3–7.9)
NEUT #: 7.4 10*3/UL (ref 2.3–7.9)
NEUT %: 67.8 % (ref 47–73)
NEUT %: 72.9 % (ref 47–73)
NRBC BLD QL AUTO: 0 % (ref 0–0)
NRBC BLD QL AUTO: 0 10*3/UL (ref 0–0)
PLATELET # BLD AUTO: 115 10*3/UL (ref 130–400)
PLATELET # BLD AUTO: 116 10*3/UL (ref 130–400)
PMV BLD AUTO: 10.9 FL (ref 9.6–12.3)
PMV BLD AUTO: 11.8 FL (ref 9.6–12.3)
POTASSIUM SERPL-SCNC: 4.9 MMOL/L (ref 3.5–5.1)
RBC # BLD AUTO: 2.13 10*6/UL (ref 4.5–5.9)
RBC # BLD AUTO: 2.59 10*6/UL (ref 4.5–5.9)
SODIUM SERPL-SCNC: 142 MMOL/L (ref 136–145)
WBC NRBC COR # BLD AUTO: 10.2 10*3/UL (ref 4.8–10.8)
WBC NRBC COR # BLD AUTO: 7.8 10*3/UL (ref 4.8–10.8)

## 2018-01-07 PROCEDURE — 30233N1 TRANSFUSION OF NONAUTOLOGOUS RED BLOOD CELLS INTO PERIPHERAL VEIN, PERCUTANEOUS APPROACH: ICD-10-PCS | Performed by: INTERNAL MEDICINE

## 2018-01-08 VITALS — DIASTOLIC BLOOD PRESSURE: 54 MMHG | SYSTOLIC BLOOD PRESSURE: 105 MMHG

## 2018-01-08 VITALS — DIASTOLIC BLOOD PRESSURE: 51 MMHG | SYSTOLIC BLOOD PRESSURE: 93 MMHG

## 2018-01-08 VITALS — SYSTOLIC BLOOD PRESSURE: 90 MMHG | DIASTOLIC BLOOD PRESSURE: 52 MMHG

## 2018-01-08 VITALS — SYSTOLIC BLOOD PRESSURE: 102 MMHG | DIASTOLIC BLOOD PRESSURE: 55 MMHG

## 2018-01-08 LAB
BASOPHILS # BLD AUTO: 0 10*3/UL (ref 0–0.1)
BASOPHILS NFR BLD AUTO: 0.5 % (ref 0–1)
BUN SERPL-MCNC: 25 MG/DL (ref 7–24)
CHLORIDE SERPL-SCNC: 114 MMOL/L (ref 98–107)
CREAT SERPL-MCNC: 0.96 MG/DL (ref 0.7–1.3)
EOSINOPHIL # BLD AUTO: 0.2 10*3/UL (ref 0–0.4)
EOSINOPHIL # BLD AUTO: 1.9 % (ref 1–4)
ERYTHROCYTE [DISTWIDTH] IN BLOOD BY AUTOMATED COUNT: 17.9 % (ref 0–14.5)
HCT VFR BLD AUTO: 24.4 % (ref 42–52)
HGB BLD-MCNC: 7.5 G/DL (ref 14–18)
LYMPHOCYTES # BLD AUTO: 1.9 10*3/UL (ref 1.3–4.4)
LYMPHOCYTES NFR BLD AUTO: 21.9 % (ref 27–41)
MCH RBC QN AUTO: 29.5 PG (ref 27–31)
MCHC RBC AUTO-ENTMCNC: 30.7 G/DL (ref 33–37)
MCV RBC AUTO: 96.1 FL (ref 80–94)
MONOCYTES # BLD AUTO: 0.7 10*3/UL (ref 0.1–1)
MONOCYTES NFR BLD MANUAL: 8.4 % (ref 3–9)
NEUT #: 5.9 10*3/UL (ref 2.3–7.9)
NEUT %: 67 % (ref 47–73)
NRBC BLD QL AUTO: 0 10*3/UL (ref 0–0)
PLATELET # BLD AUTO: 112 10*3/UL (ref 130–400)
PMV BLD AUTO: 11.8 FL (ref 9.6–12.3)
POTASSIUM SERPL-SCNC: 4.6 MMOL/L (ref 3.5–5.1)
RBC # BLD AUTO: 2.54 10*6/UL (ref 4.5–5.9)
SODIUM SERPL-SCNC: 141 MMOL/L (ref 136–145)
WBC NRBC COR # BLD AUTO: 8.9 10*3/UL (ref 4.8–10.8)

## 2018-01-16 ENCOUNTER — HOSPITAL ENCOUNTER (EMERGENCY)
Dept: HOSPITAL 83 - ED | Age: 58
Discharge: ANOTHER HOSPITAL | End: 2018-01-16
Payer: COMMERCIAL

## 2018-01-16 VITALS — DIASTOLIC BLOOD PRESSURE: 39 MMHG | SYSTOLIC BLOOD PRESSURE: 103 MMHG

## 2018-01-16 VITALS — WEIGHT: 135 LBS | HEIGHT: 65 IN | BODY MASS INDEX: 22.49 KG/M2

## 2018-01-16 DIAGNOSIS — F17.200: ICD-10-CM

## 2018-01-16 DIAGNOSIS — E11.9: ICD-10-CM

## 2018-01-16 DIAGNOSIS — E87.5: ICD-10-CM

## 2018-01-16 DIAGNOSIS — J44.9: ICD-10-CM

## 2018-01-16 DIAGNOSIS — I10: ICD-10-CM

## 2018-01-16 DIAGNOSIS — R41.0: Primary | ICD-10-CM

## 2018-01-16 DIAGNOSIS — M10.9: ICD-10-CM

## 2018-01-16 DIAGNOSIS — Z86.718: ICD-10-CM

## 2018-01-16 LAB
ALBUMIN SERPL-MCNC: 1.7 GM/DL (ref 3.1–4.5)
ALBUMIN SERPL-MCNC: 1.8 GM/DL (ref 3.1–4.5)
ALP SERPL-CCNC: 432 U/L (ref 45–117)
ALP SERPL-CCNC: 502 U/L (ref 45–117)
ALT SERPL W P-5'-P-CCNC: 57 U/L (ref 12–78)
ALT SERPL W P-5'-P-CCNC: 69 U/L (ref 12–78)
APTT PPP: 32.6 SECONDS (ref 20.8–31.5)
AST SERPL-CCNC: 46 IU/L (ref 3–35)
AST SERPL-CCNC: 55 IU/L (ref 3–35)
BASOPHILS # BLD AUTO: 0 10*3/UL (ref 0–0.1)
BASOPHILS NFR BLD AUTO: 0.1 % (ref 0–1)
BUN SERPL-MCNC: 102 MG/DL (ref 7–24)
BUN SERPL-MCNC: 99 MG/DL (ref 7–24)
CHLORIDE SERPL-SCNC: 109 MMOL/L (ref 98–107)
CHLORIDE SERPL-SCNC: 112 MMOL/L (ref 98–107)
CREAT SERPL-MCNC: 3.39 MG/DL (ref 0.7–1.3)
CREAT SERPL-MCNC: 3.75 MG/DL (ref 0.7–1.3)
EOSINOPHIL # BLD AUTO: 0 % (ref 1–4)
EOSINOPHIL # BLD AUTO: 0 10*3/UL (ref 0–0.4)
ERYTHROCYTE [DISTWIDTH] IN BLOOD BY AUTOMATED COUNT: 17.6 % (ref 0–14.5)
HCT VFR BLD AUTO: 28.5 % (ref 42–52)
HGB BLD-MCNC: 9.2 G/DL (ref 14–18)
INR BLD: 1.2 (ref 2–3.5)
LIPASE SERPL-CCNC: 286 U/L (ref 73–393)
LYMPHOCYTES # BLD AUTO: 0.8 10*3/UL (ref 1.3–4.4)
LYMPHOCYTES NFR BLD AUTO: 7.6 % (ref 27–41)
MCH RBC QN AUTO: 29.7 PG (ref 27–31)
MCHC RBC AUTO-ENTMCNC: 32.3 G/DL (ref 33–37)
MCV RBC AUTO: 91.9 FL (ref 80–94)
MONOCYTES # BLD AUTO: 0.3 10*3/UL (ref 0.1–1)
MONOCYTES NFR BLD MANUAL: 3.1 % (ref 3–9)
NEUT #: 9.1 10*3/UL (ref 2.3–7.9)
NEUT %: 88.6 % (ref 47–73)
NRBC BLD QL AUTO: 0 % (ref 0–0)
PLATELET # BLD AUTO: 146 10*3/UL (ref 130–400)
PMV BLD AUTO: 13.2 FL (ref 9.6–12.3)
POTASSIUM SERPL-SCNC: 6.1 MMOL/L (ref 3.5–5.1)
POTASSIUM SERPL-SCNC: 6.3 MMOL/L (ref 3.5–5.1)
PROT SERPL-MCNC: 6.3 GM/DL (ref 6.4–8.2)
PROT SERPL-MCNC: 7.3 GM/DL (ref 6.4–8.2)
RBC # BLD AUTO: 3.1 10*6/UL (ref 4.5–5.9)
SODIUM SERPL-SCNC: 135 MMOL/L (ref 136–145)
SODIUM SERPL-SCNC: 137 MMOL/L (ref 136–145)
TROPONIN I SERPL-MCNC: < 0.015 NG/ML (ref ?–0.04)
WBC NRBC COR # BLD AUTO: 10.3 10*3/UL (ref 4.8–10.8)

## 2023-07-31 ENCOUNTER — OFFICE VISIT (OUTPATIENT)
Dept: URBAN - METROPOLITAN AREA CLINIC 90 | Facility: CLINIC | Age: 63
End: 2023-07-31
Payer: MEDICAID

## 2023-07-31 DIAGNOSIS — H35.033 HYPERTENSIVE RETINOPATHY, BILATERAL: Primary | ICD-10-CM

## 2023-07-31 DIAGNOSIS — H25.13 AGE-RELATED NUCLEAR CATARACT, BILATERAL: ICD-10-CM

## 2023-07-31 PROCEDURE — 99204 OFFICE O/P NEW MOD 45 MIN: CPT | Performed by: OPHTHALMOLOGY

## 2023-07-31 ASSESSMENT — INTRAOCULAR PRESSURE
OD: 22
OS: 20